# Patient Record
Sex: FEMALE | Race: WHITE | NOT HISPANIC OR LATINO | Employment: FULL TIME | URBAN - METROPOLITAN AREA
[De-identification: names, ages, dates, MRNs, and addresses within clinical notes are randomized per-mention and may not be internally consistent; named-entity substitution may affect disease eponyms.]

---

## 2017-12-29 DIAGNOSIS — Z00.00 PREVENTATIVE HEALTH CARE: Primary | ICD-10-CM

## 2017-12-29 PROBLEM — E78.5 HLD (HYPERLIPIDEMIA): Status: ACTIVE | Noted: 2017-12-29

## 2017-12-29 PROBLEM — E55.9 AVITAMINOSIS D: Status: ACTIVE | Noted: 2017-12-29

## 2017-12-29 PROBLEM — R53.83 FATIGUE: Status: ACTIVE | Noted: 2017-12-29

## 2017-12-29 PROBLEM — R73.9 BLOOD GLUCOSE ELEVATED: Status: ACTIVE | Noted: 2017-12-29

## 2017-12-29 PROBLEM — R06.89 LOUD BREATHING DURING SLEEP: Status: ACTIVE | Noted: 2017-12-29

## 2018-01-02 LAB
ALBUMIN SERPL-MCNC: 4.1 G/DL (ref 3.5–5.2)
ALBUMIN/GLOB SERPL: 1.5 G/DL
ALP SERPL-CCNC: 51 U/L (ref 39–117)
ALT SERPL-CCNC: 10 U/L (ref 1–33)
APPEARANCE UR: CLEAR
AST SERPL-CCNC: 11 U/L (ref 1–32)
BACTERIA #/AREA URNS HPF: ABNORMAL /HPF
BASOPHILS # BLD AUTO: 0.08 10*3/MM3 (ref 0–0.2)
BASOPHILS NFR BLD AUTO: 1.1 % (ref 0–1.5)
BILIRUB SERPL-MCNC: 0.3 MG/DL (ref 0.1–1.2)
BILIRUB UR QL STRIP: NEGATIVE
BUN SERPL-MCNC: 14 MG/DL (ref 6–20)
BUN/CREAT SERPL: 19.2 (ref 7–25)
CALCIUM SERPL-MCNC: 9.6 MG/DL (ref 8.6–10.5)
CASTS URNS MICRO: ABNORMAL
CHLORIDE SERPL-SCNC: 104 MMOL/L (ref 98–107)
CHOLEST SERPL-MCNC: 207 MG/DL (ref 0–200)
CHOLEST/HDLC SERPL: 3.39 {RATIO}
CO2 SERPL-SCNC: 23.4 MMOL/L (ref 22–29)
COLOR UR: YELLOW
CREAT SERPL-MCNC: 0.73 MG/DL (ref 0.57–1)
EOSINOPHIL # BLD AUTO: 0.25 10*3/MM3 (ref 0–0.7)
EOSINOPHIL NFR BLD AUTO: 3.6 % (ref 0.3–6.2)
EPI CELLS #/AREA URNS HPF: ABNORMAL /HPF
ERYTHROCYTE [DISTWIDTH] IN BLOOD BY AUTOMATED COUNT: 14.2 % (ref 11.7–13)
GLOBULIN SER CALC-MCNC: 2.8 GM/DL
GLUCOSE SERPL-MCNC: 115 MG/DL (ref 65–99)
GLUCOSE UR QL: NEGATIVE
HBA1C MFR BLD: 5.69 % (ref 4.8–5.6)
HCT VFR BLD AUTO: 37.4 % (ref 35.6–45.5)
HDLC SERPL-MCNC: 61 MG/DL (ref 40–60)
HGB BLD-MCNC: 12.1 G/DL (ref 11.9–15.5)
HGB UR QL STRIP: ABNORMAL
IMM GRANULOCYTES # BLD: 0.02 10*3/MM3 (ref 0–0.03)
IMM GRANULOCYTES NFR BLD: 0.3 % (ref 0–0.5)
KETONES UR QL STRIP: NEGATIVE
LDLC SERPL CALC-MCNC: 126 MG/DL (ref 0–100)
LEUKOCYTE ESTERASE UR QL STRIP: NEGATIVE
LYMPHOCYTES # BLD AUTO: 2.75 10*3/MM3 (ref 0.9–4.8)
LYMPHOCYTES NFR BLD AUTO: 39.5 % (ref 19.6–45.3)
MCH RBC QN AUTO: 26.7 PG (ref 26.9–32)
MCHC RBC AUTO-ENTMCNC: 32.4 G/DL (ref 32.4–36.3)
MCV RBC AUTO: 82.4 FL (ref 80.5–98.2)
MONOCYTES # BLD AUTO: 0.46 10*3/MM3 (ref 0.2–1.2)
MONOCYTES NFR BLD AUTO: 6.6 % (ref 5–12)
NEUTROPHILS # BLD AUTO: 3.41 10*3/MM3 (ref 1.9–8.1)
NEUTROPHILS NFR BLD AUTO: 48.9 % (ref 42.7–76)
NITRITE UR QL STRIP: NEGATIVE
PH UR STRIP: 5.5 [PH] (ref 5–8)
PLATELET # BLD AUTO: 349 10*3/MM3 (ref 140–500)
POTASSIUM SERPL-SCNC: 4.4 MMOL/L (ref 3.5–5.2)
PROT SERPL-MCNC: 6.9 G/DL (ref 6–8.5)
PROT UR QL STRIP: NEGATIVE
RBC # BLD AUTO: 4.54 10*6/MM3 (ref 3.9–5.2)
RBC #/AREA URNS HPF: ABNORMAL /HPF
SODIUM SERPL-SCNC: 141 MMOL/L (ref 136–145)
SP GR UR: 1.02 (ref 1–1.03)
T4 FREE SERPL-MCNC: 1.2 NG/DL (ref 0.93–1.7)
TRIGL SERPL-MCNC: 99 MG/DL (ref 0–150)
TSH SERPL DL<=0.005 MIU/L-ACNC: 1.96 MIU/ML (ref 0.27–4.2)
UROBILINOGEN UR STRIP-MCNC: ABNORMAL MG/DL
VLDLC SERPL CALC-MCNC: 19.8 MG/DL (ref 5–40)
WBC # BLD AUTO: 6.97 10*3/MM3 (ref 4.5–10.7)
WBC #/AREA URNS HPF: ABNORMAL /HPF

## 2018-01-08 ENCOUNTER — OFFICE VISIT (OUTPATIENT)
Dept: INTERNAL MEDICINE | Age: 38
End: 2018-01-08

## 2018-01-08 VITALS
WEIGHT: 158 LBS | OXYGEN SATURATION: 99 % | SYSTOLIC BLOOD PRESSURE: 120 MMHG | HEART RATE: 78 BPM | HEIGHT: 66 IN | BODY MASS INDEX: 25.39 KG/M2 | DIASTOLIC BLOOD PRESSURE: 72 MMHG | TEMPERATURE: 97.6 F

## 2018-01-08 DIAGNOSIS — Z00.00 ENCOUNTER FOR ANNUAL HEALTH EXAMINATION: Primary | ICD-10-CM

## 2018-01-08 DIAGNOSIS — E55.9 VITAMIN D DEFICIENCY: Chronic | ICD-10-CM

## 2018-01-08 DIAGNOSIS — R73.09 ELEVATED HEMOGLOBIN A1C: Chronic | ICD-10-CM

## 2018-01-08 DIAGNOSIS — R31.21 ASYMPTOMATIC MICROSCOPIC HEMATURIA: ICD-10-CM

## 2018-01-08 DIAGNOSIS — E78.2 MIXED HYPERLIPIDEMIA: Chronic | ICD-10-CM

## 2018-01-08 LAB
Lab: NORMAL
Lab: NORMAL

## 2018-01-08 PROCEDURE — 99395 PREV VISIT EST AGE 18-39: CPT | Performed by: INTERNAL MEDICINE

## 2018-01-08 PROCEDURE — 99213 OFFICE O/P EST LOW 20 MIN: CPT | Performed by: INTERNAL MEDICINE

## 2018-01-08 RX ORDER — GINSENG 100 MG
CAPSULE ORAL
COMMUNITY
Start: 2017-01-15 | End: 2018-01-08

## 2018-01-08 RX ORDER — ERGOCALCIFEROL 1.25 MG/1
50000 CAPSULE ORAL
COMMUNITY
End: 2018-01-08
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
## 2018-01-09 LAB
25(OH)D3+25(OH)D2 SERPL-MCNC: 9.6 NG/ML (ref 30–100)
WRITTEN AUTHORIZATION: NORMAL

## 2018-01-11 DIAGNOSIS — E55.9 VITAMIN D DEFICIENCY: Primary | Chronic | ICD-10-CM

## 2018-01-11 NOTE — TELEPHONE ENCOUNTER
Pt to start 50,000 u 1 time weekly for 4 months then take 2000 u daily OTC and recheck Vitamin d 4 months. Per Dr Orellana. RUBA

## 2018-01-24 DIAGNOSIS — E55.9 VITAMIN D DEFICIENCY: Chronic | ICD-10-CM

## 2019-03-14 ENCOUNTER — TELEPHONE (OUTPATIENT)
Dept: INTERNAL MEDICINE | Age: 39
End: 2019-03-14

## 2019-03-14 ENCOUNTER — OFFICE VISIT (OUTPATIENT)
Dept: INTERNAL MEDICINE | Age: 39
End: 2019-03-14

## 2019-03-14 VITALS
WEIGHT: 154 LBS | HEART RATE: 98 BPM | HEIGHT: 66 IN | DIASTOLIC BLOOD PRESSURE: 90 MMHG | OXYGEN SATURATION: 99 % | RESPIRATION RATE: 16 BRPM | BODY MASS INDEX: 24.75 KG/M2 | TEMPERATURE: 97.5 F | SYSTOLIC BLOOD PRESSURE: 164 MMHG

## 2019-03-14 DIAGNOSIS — R29.898 BILATERAL LEG WEAKNESS: ICD-10-CM

## 2019-03-14 DIAGNOSIS — R25.2 HAND CRAMPS: ICD-10-CM

## 2019-03-14 DIAGNOSIS — R52 BODY ACHES: Primary | ICD-10-CM

## 2019-03-14 DIAGNOSIS — J06.9 UPPER RESPIRATORY TRACT INFECTION, UNSPECIFIED TYPE: ICD-10-CM

## 2019-03-14 LAB
EXPIRATION DATE: NORMAL
FLUAV AG NPH QL: NEGATIVE
FLUBV AG NPH QL: NEGATIVE
INTERNAL CONTROL: NORMAL
Lab: NORMAL

## 2019-03-14 PROCEDURE — 87804 INFLUENZA ASSAY W/OPTIC: CPT | Performed by: NURSE PRACTITIONER

## 2019-03-14 PROCEDURE — 99213 OFFICE O/P EST LOW 20 MIN: CPT | Performed by: NURSE PRACTITIONER

## 2019-03-14 NOTE — TELEPHONE ENCOUNTER
Patient was seen this morning (03-), patient called to check on the status of her lab work.  Patient also says Tylenol and Advil are not working, patient wants to know if there's anything else she can do or if there's any other medication that can be prescribed.  Please advise. Patient can be reached at [phone]

## 2019-03-14 NOTE — PROGRESS NOTES
"Gia Graves / 38 y.o. / female  Encounter Date: 03/14/2019    ASSESSMENT & PLAN:    Problem List Items Addressed This Visit     None      Visit Diagnoses     Body aches    -  Primary    Relevant Orders    POC Influenza A / B (Completed)    Sedimentation rate, automated    C-reactive protein    Aldolase    CK MB    CK    Upper respiratory tract infection, unspecified type        Bilateral leg weakness        Relevant Orders    Sedimentation rate, automated    C-reactive protein    Aldolase    CK MB    CK    Hand cramps            Orders Placed This Encounter   Procedures   • Sedimentation rate, automated   • C-reactive protein   • Aldolase   • CK MB   • CK   • POC Influenza A / B     No orders of the defined types were placed in this encounter.      Summary/Discussion:  1. Inflammatory and muscle enzyme labs check for complaint of full body muscle aches and [place] leg weakness. E&M based on results.   2. Symptoms present as acute viral illness. Recommended pt take NSAIDs at home for aches PRN, rest frequently, push hydration.  3. RTC if sx fail to improve or worsen. F/u with next scheduled visit with Dr. Orellana.     Return if symptoms worsen or fail to improve, for Next scheduled follow up.  ________________________________________________________________    VITALS:    Visit Vitals  /90   Pulse 98   Temp 97.5 °F (36.4 °C)   Resp 16   Ht 167.6 cm (66\")   Wt 69.9 kg (154 lb)   SpO2 99%   BMI 24.86 kg/m²       BP Readings from Last 3 Encounters:   03/14/19 164/90   01/08/18 120/72   05/19/15 130/80     Wt Readings from Last 3 Encounters:   03/14/19 69.9 kg (154 lb)   01/08/18 71.7 kg (158 lb)   05/19/15 71.7 kg (158 lb 0.1 oz)      Body mass index is 24.86 kg/m².    CC: Main reason(s) for today's visit: Sore Throat (sore throat and chills x 5 days)    HPI  Sore throat and subjective fever onset 4 days ago.  3 days ago sore throat with chest tightness, itchy ears bilaterally.  2 days ago patient felt improvement " in symptoms in the morning but afternoon bilateral leg soreness from knee below began, with weakness when walking.  Reports she went to work with weak leg feeling.  After returning home she vomited one time.  Reports decreased appetite but no nausea today, sore throat resolved, no fevers or chills. Has taken a few dose of Tylenol for discomfort and aching with minimal relief. Chest pain resolved.  Denies cough, sneezing, vision changes, congestion.  Current complaints include bilateral legs, arms, hands aching and soreness x 1 day without respiratory symptoms. Arms are also intermittently itchy. Denies allergies or contact with any new substances.     Patient Care Team:  Kenyon Orellana MD as PCP - General (Internal Medicine)  ____________________________________________________________________    REVIEW OF SYSTEMS    Review of Systems   Constitutional: Positive for activity change (decreased), appetite change (decreased) and fatigue. Negative for chills and fever.   HENT: Negative for congestion, ear pain, facial swelling, postnasal drip, rhinorrhea, sinus pressure, sinus pain, sneezing and sore throat.    Eyes: Negative.    Respiratory: Negative for cough and shortness of breath.    Cardiovascular: Negative for chest pain and palpitations.   Gastrointestinal: Positive for nausea (resolved) and vomiting (x 1 yesterday).   Genitourinary: Negative.    Musculoskeletal: Positive for arthralgias and myalgias. Negative for gait problem.   Skin: Negative.    Neurological: Positive for weakness. Negative for dizziness and headaches.   Psychiatric/Behavioral: Negative.        PHYSICAL EXAMINATION    Physical Exam   Constitutional: She is oriented to person, place, and time.   BP elevated.   HENT:   Head: Normocephalic.   Right Ear: External ear normal.   Left Ear: External ear normal.   Nose: Nose normal.   Mouth/Throat: Oropharynx is clear and moist. No oropharyngeal exudate.   Eyes: Conjunctivae and EOM are normal.   Neck:  Normal range of motion. Neck supple.   Cardiovascular: Normal rate, regular rhythm and normal heart sounds.   Pulmonary/Chest: Effort normal and breath sounds normal.   Musculoskeletal: Normal range of motion. She exhibits no edema.   VARGHESE LE sore and aching, weakness with walking. VARGHESE hands and forearms aching and intermittently itchy.   Lymphadenopathy:     She has no cervical adenopathy.   Neurological: She is alert and oriented to person, place, and time.   Skin: Skin is warm and dry. No rash noted.   Vitals reviewed.    REVIEWED DATA:    Labs:   Lab Results   Component Value Date     01/02/2018    K 4.4 01/02/2018    AST 11 01/02/2018    ALT 10 01/02/2018    BUN 14 01/02/2018    CREATININE 0.73 01/02/2018    CREATININE 0.70 05/06/2015    EGFRIFNONA 90 01/02/2018    EGFRIFAFRI 109 01/02/2018       Lab Results   Component Value Date    HGBA1C 5.69 (H) 01/02/2018    HGBA1C 5.8 (H) 05/06/2015       Lab Results   Component Value Date     (H) 01/02/2018     (H) 05/06/2015    HDL 61 (H) 01/02/2018    TRIG 99 01/02/2018    CHOLHDLRATIO 3.39 01/02/2018       Lab Results   Component Value Date    TSH 1.960 01/02/2018    FREET4 1.20 01/02/2018          Lab Results   Component Value Date    WBC 6.97 01/02/2018    HGB 12.1 01/02/2018    HGB 12.1 05/06/2015     01/02/2018         Imaging:      Medical Tests:      Summary of old records / correspondence / consultant report:      Request outside records:   ______________________________________________________________________    ALLERGIES  Allergies   Allergen Reactions   • No Known Drug Allergy         MEDICATIONS  Current Outpatient Medications on File Prior to Visit   Medication Sig   • [DISCONTINUED] Cholecalciferol (VITAMIN D3) 01090 units tablet Take 50,000 Units by mouth Every 7 (Seven) Days.     No current facility-administered medications on file prior to visit.        PFSH:     The following portions of the patient's history were reviewed  and updated as appropriate: Allergies / Current Medications / Past Medical History / Surgical History / Social History / Family History    PROBLEM LIST   Patient Active Problem List   Diagnosis   • Mixed hyperlipidemia   • Vitamin D deficiency   • Elevated hemoglobin A1c   • Asymptomatic microscopic hematuria       PAST MEDICAL HISTORY  Past Medical History:   Diagnosis Date   • Hyperlipidemia    • Vitamin D deficiency        SURGICAL HISTORY  Past Surgical History:   Procedure Laterality Date   • APPENDECTOMY         SOCIAL HISTORY  Social History     Socioeconomic History   • Marital status: Single     Spouse name: Not on file   • Number of children: 0   • Years of education: Not on file   • Highest education level: Not on file   Occupational History   • Occupation: ([employer]    Tobacco Use   • Smoking status: Former Smoker     Types: Cigarettes   • Smokeless tobacco: Never Used   • Tobacco comment: former social smoker when younger   Substance and Sexual Activity   • Alcohol use: Yes     Comment: occasionally during weekends   • Sexual activity: No     Birth control/protection: None       FAMILY HISTORY  Family History   Problem Relation Age of Onset   • Hyperlipidemia Mother    • Diabetes Mother    • Other Father         prediabetes   • Hypertension Father    • Hyperlipidemia Father    • Lung cancer Maternal Grandmother    • Aortic aneurysm Paternal Grandfather    • Colon cancer Neg Hx    • Breast cancer Neg Hx

## 2019-03-14 NOTE — PROGRESS NOTES
4 days ago, feels like fever, took tylenol.   Monday- pain in chest, sore throat, itchy ears.  tues-  Wed- improving. Afternoon sore legs knee and lower, walking decreased. Drank vitamin c. Part time job. Vomitted. Tylenol and bed  thurs morning- body aching and itching. Decreased appetite.   No fever

## 2019-03-14 NOTE — TELEPHONE ENCOUNTER
Left message for patient: Tramadol called in to pt's pharmacy for VARGHESE leg pain/cramps. Verbal order from Dr. Orellana for medication approval.  Add on BMP to today's labs to look for electrolyte deficiencies, plan to treat accordingly.

## 2019-03-15 ENCOUNTER — TELEPHONE (OUTPATIENT)
Dept: INTERNAL MEDICINE | Age: 39
End: 2019-03-15

## 2019-03-15 DIAGNOSIS — R52 BODY ACHES: Primary | ICD-10-CM

## 2019-03-15 LAB
ALDOLASE SERPL-CCNC: 3.1 U/L (ref 3.3–10.3)
BUN SERPL-MCNC: NORMAL MG/DL
CALCIUM SERPL-MCNC: NORMAL MG/DL
CHLORIDE SERPL-SCNC: NORMAL MMOL/L
CK MB SERPL-MCNC: NORMAL NG/ML
CK SERPL-CCNC: 49 U/L (ref 20–180)
CO2 SERPL-SCNC: NORMAL MMOL/L
CREAT SERPL-MCNC: NORMAL MG/DL
CRP SERPL-MCNC: 2.2 MG/DL (ref 0–0.5)
ERYTHROCYTE [SEDIMENTATION RATE] IN BLOOD BY WESTERGREN METHOD: 25 MM/HR (ref 0–20)
GLUCOSE SERPL-MCNC: NORMAL MG/DL
POTASSIUM SERPL-SCNC: NORMAL MMOL/L
SODIUM SERPL-SCNC: NORMAL MMOL/L

## 2019-03-15 RX ORDER — TRAMADOL HYDROCHLORIDE 50 MG/1
50 TABLET ORAL EVERY 6 HOURS PRN
Qty: 9 TABLET | Refills: 0 | OUTPATIENT
Start: 2019-03-15 | End: 2019-03-27

## 2019-03-15 NOTE — TELEPHONE ENCOUNTER
Patient is calling requesting someone call her with her lab results.  Please advise.  Patient can be reached at [phone]

## 2019-03-15 NOTE — TELEPHONE ENCOUNTER
Spoke to patient to make sure she got the message about sending Tramadol to her pharmacy.  She got the message and is picking up the medication today.  I also told her that we will call her once her lab results are in   ----- Message from BRENT Bustos sent at 3/15/2019  8:00 AM EDT -----  Doris,   Please call Tabby to check on tramadol pickup.

## 2019-03-15 NOTE — TELEPHONE ENCOUNTER
Spoke to patient and informed her that her labs were not in yet. Advised [facility] to [facility] so she could monitor the results over the weekend.

## 2019-03-18 ENCOUNTER — TELEPHONE (OUTPATIENT)
Dept: INTERNAL MEDICINE | Age: 39
End: 2019-03-18

## 2019-03-18 DIAGNOSIS — R52 BODY ACHES: Primary | ICD-10-CM

## 2019-03-18 NOTE — TELEPHONE ENCOUNTER
Spoke to patient and informed her that her labs did show non-specific inflammation.  She said that she was feeling better.  Since she is feeling better, Leydi said she can f/u as needed.  I also informed her that we did add a potassium to her labs and the results should be in in a day or so.  She expressed understanding

## 2019-03-25 ENCOUNTER — TELEPHONE (OUTPATIENT)
Dept: INTERNAL MEDICINE | Age: 39
End: 2019-03-25

## 2019-03-25 NOTE — TELEPHONE ENCOUNTER
After speaking to Leydi, I called that patient and she says she is still not feeling right and thinks that she may need to have some more labs drawn.  Advised her that she will need an appt.  She exressed understanding and an appointment was made for her on the phone today.

## 2019-03-25 NOTE — PROGRESS NOTES
"Gia Graves / 38 y.o. / female  Encounter Date: 03/27/2019    ASSESSMENT & PLAN:    Problem List Items Addressed This Visit        Digestive    Vitamin D deficiency (Chronic)    Relevant Orders    Vitamin D 25 Hydroxy      Other Visit Diagnoses     Bilateral leg weakness    -  Primary    Relevant Orders    Comprehensive Metabolic Panel    CBC & Differential    CK    GM    Muscle cramping        Relevant Orders    Comprehensive Metabolic Panel    CBC & Differential    CK    GM    Pain in both upper extremities        Relevant Orders    Comprehensive Metabolic Panel    CBC & Differential    CK    GM        Orders Placed This Encounter   Procedures   • Comprehensive Metabolic Panel   • Vitamin D 25 Hydroxy   • CK   • GM   • CBC & Differential     No orders of the defined types were placed in this encounter.      Summary/Discussion:  1. Labs ordered to explore possible electrolyte imbalances, kidney and liver function, autoimmune response, anemia/infectious conditions, muscle enzymes, vitamin d level (history of vitamin d deficiency).   2. Advised pt labs are likely to be normal today since symptoms are resolved at this time. She verbalizes she wants them all done anyway. I told her to go to the ED if symptoms return to have complete work up done for unspecified, intermittent extremity pain and weakness.     Return if symptoms worsen or fail to improve.  ________________________________________________________________    VITALS:    Visit Vitals  /64   Pulse 88   Temp 97 °F (36.1 °C)   Resp 16   Ht 167.6 cm (66\")   Wt 68.9 kg (152 lb)   SpO2 99%   BMI 24.53 kg/m²       BP Readings from Last 3 Encounters:   03/27/19 120/64   03/14/19 164/90   01/08/18 120/72     Wt Readings from Last 3 Encounters:   03/27/19 68.9 kg (152 lb)   03/14/19 69.9 kg (154 lb)   01/08/18 71.7 kg (158 lb)      Body mass index is 24.53 kg/m².    CC: Main reason(s) for today's visit: Generalized Body Aches (patients still having " body aches- especially the left arm and right hand, also having nausea)    HPI    Patient is a 38 y.o. female who is here for complaints of left total arm pain and right wrist and hand pain 2 days ago, and nausea.  Symptoms have since resolved.  2 weeks ago she had onset of leg weakness and weakness of bilateral arms at that time.  No preceding infection, or illnesses.  Patient has not been engaged in any new physical activities.  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  Neither job exacerbates the symptoms.  All symptoms are resolved today.  Pain is not immobile paralysis, numbness, tingling, sharp.  She said when experiencing the pain it feels like she just does not want to use that limb that is affecte because it hurts, but she maintains full mobility despite pain.  She follows a regular diet including red meat and other animal proteins, except for 2 days a week she has a vegan diet for Shinto reasons.  Family history negative for autoimmune disorders.    Bilateral DTRs intact, normal.  Upper and lower extremity strength equal bilaterally.  Upper and lower extremity pulses strong, intact, equal bilaterally.  Denies chest pain, palpitations, headaches, vision changes, SOA, weakness, sensory deficits.     Patient Care Team:  Kenyon Orellana MD as PCP - General (Internal Medicine)  ____________________________________________________________________    REVIEW OF SYSTEMS    Review of Systems   Constitutional: Negative for activity change, appetite change, chills, fatigue, fever and unexpected weight change.   HENT: Negative.    Eyes: Negative.    Respiratory: Negative.    Cardiovascular: Negative for chest pain, palpitations and leg swelling.   Gastrointestinal: Negative.  Negative for constipation, diarrhea and nausea.   Endocrine: Negative.    Genitourinary: Negative.    Musculoskeletal: Negative for arthralgias and myalgias.   Skin: Negative.    Neurological: Negative.  Negative for headaches.    Psychiatric/Behavioral: Negative.      PHYSICAL EXAMINATION    Physical Exam   Constitutional: She is oriented to person, place, and time. She appears well-developed. No distress.   Eyes: Conjunctivae and EOM are normal. Pupils are equal, round, and reactive to light.   Neck: Normal range of motion. Neck supple.   Cardiovascular: Normal rate, regular rhythm, normal heart sounds and intact distal pulses.   Pulmonary/Chest: Effort normal and breath sounds normal.   Musculoskeletal: Normal range of motion. She exhibits no edema, tenderness or deformity.   Lymphadenopathy:     She has no cervical adenopathy.   Neurological: She is alert and oriented to person, place, and time. She displays normal reflexes. No cranial nerve deficit or sensory deficit. She exhibits normal muscle tone. Coordination normal.   Skin: Skin is warm and dry. No rash noted. No erythema.   Psychiatric: She has a normal mood and affect.   Vitals reviewed.    REVIEWED DATA:    Labs:   Lab Results   Component Value Date    NA CANCELED 03/14/2019    K CANCELED 03/14/2019    AST 11 01/02/2018    ALT 10 01/02/2018    BUN CANCELED 03/14/2019    CREATININE CANCELED 03/14/2019    CREATININE 0.73 01/02/2018    CREATININE 0.70 05/06/2015    EGFRIFNONA 90 01/02/2018    EGFRIFAFRI 109 01/02/2018       Lab Results   Component Value Date    HGBA1C 5.69 (H) 01/02/2018    HGBA1C 5.8 (H) 05/06/2015       Lab Results   Component Value Date     (H) 01/02/2018     (H) 05/06/2015    HDL 61 (H) 01/02/2018    TRIG 99 01/02/2018    CHOLHDLRATIO 3.39 01/02/2018       Lab Results   Component Value Date    TSH 1.960 01/02/2018    FREET4 1.20 01/02/2018          Lab Results   Component Value Date    WBC 6.97 01/02/2018    HGB 12.1 01/02/2018    HGB 12.1 05/06/2015     01/02/2018         Imaging:      Medical Tests:      Summary of old records / correspondence / consultant report:      Request outside records:    ______________________________________________________________________    ALLERGIES  Allergies   Allergen Reactions   • No Known Drug Allergy         MEDICATIONS  Current Outpatient Medications on File Prior to Visit   Medication Sig   • [DISCONTINUED] traMADol (ULTRAM) 50 MG tablet Take 1 tablet by mouth Every 6 (Six) Hours As Needed for Moderate Pain .     No current facility-administered medications on file prior to visit.        PFSH:     The following portions of the patient's history were reviewed and updated as appropriate: Allergies / Current Medications / Past Medical History / Surgical History / Social History / Family History    PROBLEM LIST   Patient Active Problem List   Diagnosis   • Mixed hyperlipidemia   • Vitamin D deficiency   • Elevated hemoglobin A1c   • Asymptomatic microscopic hematuria       PAST MEDICAL HISTORY  Past Medical History:   Diagnosis Date   • Hyperlipidemia    • Vitamin D deficiency        SURGICAL HISTORY  Past Surgical History:   Procedure Laterality Date   • APPENDECTOMY         SOCIAL HISTORY  Social History     Socioeconomic History   • Marital status: Single     Spouse name: Not on file   • Number of children: 0   • Years of education: Not on file   • Highest education level: Not on file   Occupational History   • Occupation: ([employer]    Tobacco Use   • Smoking status: Former Smoker     Types: Cigarettes   • Smokeless tobacco: Never Used   • Tobacco comment: former social smoker when younger   Substance and Sexual Activity   • Alcohol use: Yes     Comment: occasionally during weekends   • Sexual activity: No     Birth control/protection: None       FAMILY HISTORY  Family History   Problem Relation Age of Onset   • Hyperlipidemia Mother    • Diabetes Mother    • Other Father         prediabetes   • Hypertension Father    • Hyperlipidemia Father    • Lung cancer Maternal Grandmother    • Aortic aneurysm Paternal Grandfather    • Colon cancer Neg Hx     • Breast cancer Neg Hx

## 2019-03-25 NOTE — TELEPHONE ENCOUNTER
Patient wanted to know her recent lab results especially potassium that Capri ordered.       She wanted to let Leydi know she had no pain in Left arm or right hand pain until she was woken up with pain early Saturday morning. She had pain all day Sunday but today when she woke up she has been pain free thus far.

## 2019-03-27 ENCOUNTER — OFFICE VISIT (OUTPATIENT)
Dept: INTERNAL MEDICINE | Age: 39
End: 2019-03-27

## 2019-03-27 VITALS
HEART RATE: 88 BPM | RESPIRATION RATE: 16 BRPM | WEIGHT: 152 LBS | TEMPERATURE: 97 F | HEIGHT: 66 IN | BODY MASS INDEX: 24.43 KG/M2 | DIASTOLIC BLOOD PRESSURE: 64 MMHG | SYSTOLIC BLOOD PRESSURE: 120 MMHG | OXYGEN SATURATION: 99 %

## 2019-03-27 DIAGNOSIS — E55.9 VITAMIN D DEFICIENCY: Chronic | ICD-10-CM

## 2019-03-27 DIAGNOSIS — M79.602 PAIN IN BOTH UPPER EXTREMITIES: ICD-10-CM

## 2019-03-27 DIAGNOSIS — R25.2 MUSCLE CRAMPING: ICD-10-CM

## 2019-03-27 DIAGNOSIS — R29.898 BILATERAL LEG WEAKNESS: Primary | ICD-10-CM

## 2019-03-27 DIAGNOSIS — M79.601 PAIN IN BOTH UPPER EXTREMITIES: ICD-10-CM

## 2019-03-27 PROCEDURE — 99214 OFFICE O/P EST MOD 30 MIN: CPT | Performed by: NURSE PRACTITIONER

## 2019-03-28 LAB
25(OH)D3+25(OH)D2 SERPL-MCNC: 22.9 NG/ML (ref 30–100)
ALBUMIN SERPL-MCNC: 4.5 G/DL (ref 3.5–5.2)
ALBUMIN/GLOB SERPL: 1.6 G/DL
ALP SERPL-CCNC: 77 U/L (ref 39–117)
ALT SERPL-CCNC: 36 U/L (ref 1–33)
ANA SER QL: NEGATIVE
AST SERPL-CCNC: 18 U/L (ref 1–32)
BASOPHILS # BLD AUTO: (no result) 10*3/UL
BASOPHILS # BLD MANUAL: 0.3 10*3/MM3 (ref 0–0.2)
BASOPHILS NFR BLD MANUAL: 2 % (ref 0–1.5)
BILIRUB SERPL-MCNC: 0.5 MG/DL (ref 0.2–1.2)
BUN SERPL-MCNC: 7 MG/DL (ref 6–20)
BUN/CREAT SERPL: 8.5 (ref 7–25)
CALCIUM SERPL-MCNC: 9.8 MG/DL (ref 8.6–10.5)
CHLORIDE SERPL-SCNC: 101 MMOL/L (ref 98–107)
CK SERPL-CCNC: 41 U/L (ref 20–180)
CO2 SERPL-SCNC: 24.8 MMOL/L (ref 22–29)
CREAT SERPL-MCNC: 0.82 MG/DL (ref 0.57–1)
DIFFERENTIAL COMMENT: ABNORMAL
EOSINOPHIL # BLD AUTO: (no result) 10*3/UL
EOSINOPHIL # BLD MANUAL: 7 10*3/MM3 (ref 0–0.4)
EOSINOPHIL NFR BLD AUTO: (no result) %
EOSINOPHIL NFR BLD MANUAL: 46 % (ref 0.3–6.2)
ERYTHROCYTE [DISTWIDTH] IN BLOOD BY AUTOMATED COUNT: 14.1 % (ref 12.3–15.4)
GLOBULIN SER CALC-MCNC: 2.9 GM/DL
GLUCOSE SERPL-MCNC: 98 MG/DL (ref 65–99)
HCT VFR BLD AUTO: 40.7 % (ref 34–46.6)
HGB BLD-MCNC: 12.5 G/DL (ref 12–15.9)
LYMPHOCYTES # BLD AUTO: (no result) 10*3/UL
LYMPHOCYTES # BLD MANUAL: 2.74 10*3/MM3 (ref 0.7–3.1)
LYMPHOCYTES NFR BLD AUTO: (no result) %
LYMPHOCYTES NFR BLD MANUAL: 18 % (ref 19.6–45.3)
MCH RBC QN AUTO: 26 PG (ref 26.6–33)
MCHC RBC AUTO-ENTMCNC: 30.7 G/DL (ref 31.5–35.7)
MCV RBC AUTO: 84.6 FL (ref 79–97)
MONOCYTES # BLD MANUAL: 0.3 10*3/MM3 (ref 0.1–0.9)
MONOCYTES NFR BLD AUTO: (no result) %
MONOCYTES NFR BLD MANUAL: 2 % (ref 5–12)
NEUTROPHILS # BLD MANUAL: 4.87 10*3/MM3 (ref 1.4–7)
NEUTROPHILS NFR BLD AUTO: (no result) %
NEUTROPHILS NFR BLD MANUAL: 32 % (ref 42.7–76)
PLATELET # BLD AUTO: 435 10*3/MM3 (ref 140–450)
PLATELET BLD QL SMEAR: ABNORMAL
POTASSIUM SERPL-SCNC: 4.6 MMOL/L (ref 3.5–5.2)
PROT SERPL-MCNC: 7.4 G/DL (ref 6–8.5)
RBC # BLD AUTO: 4.81 10*6/MM3 (ref 3.77–5.28)
RBC MORPH BLD: ABNORMAL
SODIUM SERPL-SCNC: 140 MMOL/L (ref 136–145)
WBC # BLD AUTO: 15.21 10*3/MM3 (ref 3.4–10.8)

## 2019-04-01 ENCOUNTER — OFFICE VISIT (OUTPATIENT)
Dept: INTERNAL MEDICINE | Age: 39
End: 2019-04-01

## 2019-04-01 VITALS
OXYGEN SATURATION: 97 % | SYSTOLIC BLOOD PRESSURE: 132 MMHG | DIASTOLIC BLOOD PRESSURE: 74 MMHG | HEIGHT: 66 IN | WEIGHT: 152 LBS | BODY MASS INDEX: 24.43 KG/M2 | HEART RATE: 76 BPM | TEMPERATURE: 97.8 F

## 2019-04-01 DIAGNOSIS — M79.10 MYALGIA: ICD-10-CM

## 2019-04-01 DIAGNOSIS — D72.10 EOSINOPHILIA: Primary | ICD-10-CM

## 2019-04-01 PROCEDURE — 99215 OFFICE O/P EST HI 40 MIN: CPT | Performed by: INTERNAL MEDICINE

## 2019-04-01 NOTE — PROGRESS NOTES
"[facility] INTERNAL MEDICINE  MARCELO GILLIS M.D.      Gia Graves / 38 y.o. / female  04/01/2019      ASSESSMENT & PLAN:    1. Eosinophilia    2. Myalgia      Orders Placed This Encounter   Procedures   • TSH+Free T4   • CBC & Differential     No orders of the defined types were placed in this encounter.       Summary/Discussion:    Spent 40 minutes in face-to-face encounter time and >50% of time spent in counseling regarding above medical problems/issues.     Fortunately her neurologic / myalgia symptoms have resolved.   DDx: Immune reaction to viral URI, less likely a parasitic infection or rare eosinophilic myalgia syndrome.   Recheck CBC with differential and TFT's.   She was instructed to call or return if the condition is not improving or worsening. She expressed understanding and agreed to follow above instructions.      She would like to see me for her CPE in April/May. Will okay.     ____________________________________________________________________    MEDICATIONS  No current outpatient medications on file.     No current facility-administered medications for this visit.           VITALS    Visit Vitals  /74   Pulse 76   Temp 97.8 °F (36.6 °C)   Ht 167.6 cm (65.98\")   Wt 68.9 kg (152 lb)   SpO2 97%   BMI 24.55 kg/m²       BP Readings from Last 3 Encounters:   04/01/19 132/74   03/27/19 120/64   03/14/19 164/90     Wt Readings from Last 3 Encounters:   04/01/19 68.9 kg (152 lb)   03/27/19 68.9 kg (152 lb)   03/14/19 69.9 kg (154 lb)      Body mass index is 24.55 kg/m².      CC:  Main reason(s) for today's visit: Bilateral leg weakness (F/U BRENT 3/27/2019)      HPI:     Seen by BRENT twice in March. Initially had URI type symptoms with sore throat on March 11. About 3 days later she developed BLE muscle \"weakness\" (below the knees) and then later in the day diffuse myalgia of BUE and BLE. Saw BRENT Angel) on 3/14 and labs were performed showing mildly elevated ESR and CRP with normal muscle enzyme levels. " She returned on 3/27 without improvement and additional labs were performed showing normal GM, CK again was normal, mildly decreased Vitamin D but CBC showed elevated WBC with significant eosinophilia (46%). She denies history of of asthma or allergies. No focal GI symptoms or any rash. Denies history of parasitic infection of GI problems. Over this weekend started feeling notably better so by Saturday felt close to normal. Denies ongoing muscle pain, weakness, paresthesia or numbness symptoms.      Patient Care Team:  Kenyon Orellana MD as PCP - General (Internal Medicine)  ____________________________________________________________________      REVIEW OF SYSTEMS    Review of Systems   Constitutional: Negative for appetite change, chills, fatigue, fever and unexpected weight change.   Respiratory: Negative.    Gastrointestinal: Negative.    Musculoskeletal: Negative for arthralgias, joint swelling and myalgias.   Skin: Negative for color change and rash.   Neurological: Negative for dizziness, seizures, weakness, numbness and headaches.   Hematological: Negative for adenopathy. Does not bruise/bleed easily.   All other systems reviewed and are negative.        PHYSICAL EXAMINATION    Physical Exam   Constitutional: She appears well-developed and well-nourished.   Neurological: She is alert.   Skin: Skin is warm. No rash noted. No erythema. No pallor.   Psychiatric: She has a normal mood and affect. Judgment and thought content normal.         REVIEWED DATA:    Labs:   Office Visit on 03/27/2019   Component Date Value Ref Range Status   • Glucose 03/27/2019 98  65 - 99 mg/dL Final   • BUN 03/27/2019 7  6 - 20 mg/dL Final   • Creatinine 03/27/2019 0.82  0.57 - 1.00 mg/dL Final   • eGFR Non African Am 03/27/2019 78  >60 mL/min/1.73 Final   • eGFR African Am 03/27/2019 95  >60 mL/min/1.73 Final   • BUN/Creatinine Ratio 03/27/2019 8.5  7.0 - 25.0 Final   • Sodium 03/27/2019 140  136 - 145 mmol/L Final   • Potassium  03/27/2019 4.6  3.5 - 5.2 mmol/L Final   • Chloride 03/27/2019 101  98 - 107 mmol/L Final   • Total CO2 03/27/2019 24.8  22.0 - 29.0 mmol/L Final   • Calcium 03/27/2019 9.8  8.6 - 10.5 mg/dL Final   • Total Protein 03/27/2019 7.4  6.0 - 8.5 g/dL Final   • Albumin 03/27/2019 4.50  3.50 - 5.20 g/dL Final   • Globulin 03/27/2019 2.9  gm/dL Final   • A/G Ratio 03/27/2019 1.6  g/dL Final   • Total Bilirubin 03/27/2019 0.5  0.2 - 1.2 mg/dL Final   • Alkaline Phosphatase 03/27/2019 77  39 - 117 U/L Final   • AST (SGOT) 03/27/2019 18  1 - 32 U/L Final   • ALT (SGPT) 03/27/2019 36* 1 - 33 U/L Final   • 25 Hydroxy, Vitamin D 03/27/2019 22.9* 30.0 - 100.0 ng/mL Final    Comment: Reference Range for Total Vitamin D 25(OH)  Deficiency <20.0 ng/mL  Insufficiency 21-29 ng/mL  Sufficiency  ng/mL  Toxicity >100 ng/ml     • WBC 03/27/2019 15.21* 3.40 - 10.80 10*3/mm3 Final   • RBC 03/27/2019 4.81  3.77 - 5.28 10*6/mm3 Final   • Hemoglobin 03/27/2019 12.5  12.0 - 15.9 g/dL Final   • Hematocrit 03/27/2019 40.7  34.0 - 46.6 % Final   • MCV 03/27/2019 84.6  79.0 - 97.0 fL Final   • MCH 03/27/2019 26.0* 26.6 - 33.0 pg Final   • MCHC 03/27/2019 30.7* 31.5 - 35.7 g/dL Final   • RDW 03/27/2019 14.1  12.3 - 15.4 % Final   • Platelets 03/27/2019 435  140 - 450 10*3/mm3 Final   • Neutrophil Rel % 03/27/2019 CANCELED   Final-Edited    Comment: Test not performed    Result canceled by the ancillary.     • Lymphocyte Rel % 03/27/2019 CANCELED   Final-Edited    Comment: Test not performed    Result canceled by the ancillary.     • Monocyte Rel % 03/27/2019 CANCELED   Final-Edited    Comment: Test not performed    Result canceled by the ancillary.     • Eosinophil Rel % 03/27/2019 CANCELED   Final-Edited    Comment: Test not performed    Result canceled by the ancillary.     • Lymphocytes Absolute 03/27/2019 CANCELED   Final-Edited    Comment: Test not performed    Result canceled by the ancillary.     • Eosinophils Absolute 03/27/2019  CANCELED   Final-Edited    Comment: Test not performed    Result canceled by the ancillary.     • Basophils Absolute 03/27/2019 CANCELED   Final-Edited    Comment: Test not performed    Result canceled by the ancillary.     • Creatine Kinase 03/27/2019 41  20 - 180 U/L Final   • GM Direct 03/27/2019 Negative  Negative Final   • Neutrophil Rel % 03/27/2019 32.0* 42.7 - 76.0 % Final   • Lymphocyte Rel % 03/27/2019 18.0* 19.6 - 45.3 % Final   • Monocyte Rel % 03/27/2019 2.0* 5.0 - 12.0 % Final   • Eosinophil Rel % 03/27/2019 46.0* 0.3 - 6.2 % Final   • Basophil Rel % 03/27/2019 2.0* 0.0 - 1.5 % Final   • Neutrophils Absolute 03/27/2019 4.87  1.40 - 7.00 10*3/mm3 Final   • Lymphocytes Absolute 03/27/2019 2.74  0.70 - 3.10 10*3/mm3 Final   • Monocytes Absolute 03/27/2019 0.30  0.10 - 0.90 10*3/mm3 Final   • Eosinophil Abs 03/27/2019 7.00* 0.00 - 0.40 10*3/mm3 Final   • Basophils Absolute 03/27/2019 0.30* 0.00 - 0.20 10*3/mm3 Final   • Differential Comment 03/27/2019 Comment   Final    WBC MORPHOLOGY               Normal                      Normal     N   • Comment 03/27/2019 Comment   Final    RBC MORPHOLOGY               Normal                      Normal     N   • Plt Comment 03/27/2019 Comment   Final    PLATELET MORPHOLOGY          Normal                      Normal     N   Office Visit on 03/14/2019   Component Date Value Ref Range Status   • Rapid Influenza A Ag 03/14/2019 Negative  Negative Final   • Rapid Influenza B Ag 03/14/2019 Negative  Negative Final   • Internal Control 03/14/2019 Passed  Passed Final   • Lot Number 03/14/2019 [id number]   Final   • Expiration Date 03/14/2019 7/2,021   Final   • Sed Rate 03/14/2019 25* 0 - 20 mm/hr Final   • C-Reactive Protein 03/14/2019 2.20* 0.00 - 0.50 mg/dL Final   • Aldolase 03/14/2019 3.1* 3.3 - 10.3 U/L Final   • CKMB 03/14/2019 CANCELED  ng/mL Final-Edited    Comment: Test not performed  Specimen Contaminated    Result canceled by the ancillary.     • Creatine Kinase  03/14/2019 49  20 - 180 U/L Final        Imaging:        Medical Tests:       Summary of old records / correspondence / consultant report:        Request outside records:       ALLERGIES  Allergies   Allergen Reactions   • No Known Drug Allergy         PFSH:     The following portions of the patient's history were reviewed and updated as appropriate: Allergies / Current Medications / Past Medical History / Surgical History / Social History / Family History    PROBLEM LIST   Patient Active Problem List   Diagnosis   • Mixed hyperlipidemia   • Vitamin D deficiency   • Elevated hemoglobin A1c   • Asymptomatic microscopic hematuria       PAST MEDICAL HISTORY  Past Medical History:   Diagnosis Date   • Hyperlipidemia    • Vitamin D deficiency        SURGICAL HISTORY  Past Surgical History:   Procedure Laterality Date   • APPENDECTOMY         SOCIAL HISTORY  Social History     Socioeconomic History   • Marital status: Single     Spouse name: Not on file   • Number of children: 0   • Years of education: Not on file   • Highest education level: Not on file   Occupational History   • Occupation: ([employer]    Tobacco Use   • Smoking status: Former Smoker     Types: Cigarettes   • Smokeless tobacco: Never Used   • Tobacco comment: former social smoker when younger   Substance and Sexual Activity   • Alcohol use: Yes     Comment: occasionally during weekends   • Sexual activity: No     Birth control/protection: None       FAMILY HISTORY  Family History   Problem Relation Age of Onset   • Hyperlipidemia Mother    • Diabetes Mother    • Other Father         prediabetes   • Hypertension Father    • Hyperlipidemia Father    • Lung cancer Maternal Grandmother    • Aortic aneurysm Paternal Grandfather    • Colon cancer Neg Hx    • Breast cancer Neg Hx          **Dragon Disclaimer:   Much of this encounter note is an electronic transcription/translation of spoken language to printed text. The electronic  translation of spoken language may permit erroneous, or at times, nonsensical words or phrases to be inadvertently transcribed. Although I have reviewed the note for such errors, some may still exist.       Template created by Brook Orellana MD

## 2019-04-02 ENCOUNTER — OFFICE VISIT (OUTPATIENT)
Dept: INTERNAL MEDICINE | Age: 39
End: 2019-04-02

## 2019-04-02 VITALS
HEIGHT: 66 IN | OXYGEN SATURATION: 96 % | TEMPERATURE: 98.4 F | BODY MASS INDEX: 24.43 KG/M2 | HEART RATE: 72 BPM | DIASTOLIC BLOOD PRESSURE: 70 MMHG | SYSTOLIC BLOOD PRESSURE: 132 MMHG | WEIGHT: 152 LBS

## 2019-04-02 DIAGNOSIS — D72.10 MARKED EOSINOPHILIA: Primary | ICD-10-CM

## 2019-04-02 LAB
BASOPHILS # BLD AUTO: (no result) 10*3/UL
BASOPHILS # BLD MANUAL: 0.17 10*3/MM3 (ref 0–0.2)
BASOPHILS NFR BLD MANUAL: 1 % (ref 0–1.5)
DIFFERENTIAL COMMENT: ABNORMAL
EOSINOPHIL # BLD AUTO: (no result) 10*3/UL
EOSINOPHIL # BLD MANUAL: 8.67 10*3/MM3 (ref 0–0.4)
EOSINOPHIL NFR BLD AUTO: (no result) %
EOSINOPHIL NFR BLD MANUAL: 50.5 % (ref 0.3–6.2)
ERYTHROCYTE [DISTWIDTH] IN BLOOD BY AUTOMATED COUNT: 13.9 % (ref 12.3–15.4)
HCT VFR BLD AUTO: 37.2 % (ref 34–46.6)
HGB BLD-MCNC: 11.4 G/DL (ref 12–15.9)
LYMPHOCYTES # BLD AUTO: (no result) 10*3/UL
LYMPHOCYTES # BLD MANUAL: 2.61 10*3/MM3 (ref 0.7–3.1)
LYMPHOCYTES NFR BLD AUTO: (no result) %
LYMPHOCYTES NFR BLD MANUAL: 15.2 % (ref 19.6–45.3)
MCH RBC QN AUTO: 25.9 PG (ref 26.6–33)
MCHC RBC AUTO-ENTMCNC: 30.6 G/DL (ref 31.5–35.7)
MCV RBC AUTO: 84.5 FL (ref 79–97)
MONOCYTES # BLD MANUAL: 0.17 10*3/MM3 (ref 0.1–0.9)
MONOCYTES NFR BLD AUTO: (no result) %
MONOCYTES NFR BLD MANUAL: 1 % (ref 5–12)
NEUTROPHILS # BLD MANUAL: 5.55 10*3/MM3 (ref 1.4–7)
NEUTROPHILS NFR BLD AUTO: (no result) %
NEUTROPHILS NFR BLD MANUAL: 32.3 % (ref 42.7–76)
PLATELET # BLD AUTO: 418 10*3/MM3 (ref 140–450)
PLATELET BLD QL SMEAR: ABNORMAL
RBC # BLD AUTO: 4.4 10*6/MM3 (ref 3.77–5.28)
RBC MORPH BLD: ABNORMAL
T4 FREE SERPL-MCNC: 1.36 NG/DL (ref 0.93–1.7)
TSH SERPL DL<=0.005 MIU/L-ACNC: 1 MIU/ML (ref 0.27–4.2)
WBC # BLD AUTO: 17.17 10*3/MM3 (ref 3.4–10.8)

## 2019-04-02 PROCEDURE — 99215 OFFICE O/P EST HI 40 MIN: CPT | Performed by: INTERNAL MEDICINE

## 2019-04-02 NOTE — ASSESSMENT & PLAN NOTE
Will need further testing as her eosinophilia has worsened since last lab.   Differential Dx: primary eosinophilia vs secondary to unknown infection (parasitic). Check peripheral blood smear, Ascariasis IgG, stool studies for O&P. Will refer to hematology for further assessment. Consider CT of chest/abd/pelvis. Will also consider referral to ID if needed.

## 2019-04-02 NOTE — PROGRESS NOTES
"[facility] INTERNAL MEDICINE  MARCELO GILLIS M.D.      Gia Graves / 38 y.o. / female  04/02/2019      ASSESSMENT & PLAN:    Problem List Items Addressed This Visit        High    Marked eosinophilia - Primary    Current Assessment & Plan     Will need further testing as her eosinophilia has worsened since last lab.   Differential Dx: primary eosinophilia vs secondary to unknown infection (parasitic). Check peripheral blood smear, Ascariasis IgG, stool studies for O&P. Will refer to hematology for further assessment. Consider CT of chest/abd/pelvis. Will also consider referral to ID if needed.          Relevant Orders    Ova & Parasite Examination - Stool, Per Rectum    Urinalysis With Microscopic If Indicated (No Culture) - Urine, Clean Catch    Peripheral Blood Smear    Strongyloides Antibody IgG, SHANTEL    Ambulatory Referral to Hematology / Oncology        Orders Placed This Encounter   Procedures   • Ova & Parasite Examination - Stool, Per Rectum   • Urinalysis With Microscopic If Indicated (No Culture) - Urine, Clean Catch   • Strongyloides Antibody IgG, SHANTEL   • Ambulatory Referral to Hematology / Oncology     No orders of the defined types were placed in this encounter.      Summary/Discussion:  · Spent 45 minutes in face-to-face encounter time and >50% of time spent in counseling regarding above medical problems/issues.      Return for worsening problem or if not improving, Next scheduled follow up.    ____________________________________________________________________    MEDICATIONS  No current outpatient medications on file.     No current facility-administered medications for this visit.           VITALS:    Visit Vitals  /70   Pulse 72   Temp 98.4 °F (36.9 °C)   Ht 167.6 cm (65.98\")   Wt 68.9 kg (152 lb)   SpO2 96%   BMI 24.55 kg/m²       BP Readings from Last 3 Encounters:   04/02/19 132/70   04/01/19 132/74   03/27/19 120/64     Wt Readings from Last 3 Encounters:   04/02/19 68.9 kg (152 lb)   04/01/19 " 68.9 kg (152 lb)   03/27/19 68.9 kg (152 lb)      Body mass index is 24.55 kg/m².    CC:  Main reason(s) for today's visit: Worsening hypereosinophilia      HPI:     She is noted to have worsening eosinophilia on labs from yesterday. Remarkably she is otherwise feeling fine. Denies fever, chills, fatigue, night sweat. Denies abdominal pain, significant n/v, diarrhea or blood in stool. She has noted some increased strawberry angioma like lesions on her arms and torso. No rash or other skin lesions. Denies cough, wheezing, shortness of breath. Denies significant chest pain or palpitations.     She does report having eaten fresh goat cheese within 1 week of initially presenting will upper respiratory symptoms.     Patient Care Team:  Kenyon Orellana MD as PCP - General (Internal Medicine)  Kenyon Orellana MD as Referring Physician (Internal Medicine)    ____________________________________________________________________    REVIEW OF SYSTEMS    Review of Systems  Constitutional neg  Resp neg  CV neg  GI neg   neg  Neuro neg for headaches, seizures, or muscle weakness/numbness  Psych anxiety  All other systems reviewed and negative       PHYSICAL EXAMINATION    Physical Exam  Constitutional  No distress  Cardiovascular Rate  normal . Rhythm: regular . Heart sounds:  Normal  Pulmonary/Chest  Effort normal. Breath sounds:  Normal  Abdomen: Soft and nontender, no palpable mass, no hepatomegaly.   Skin: multiple small strawberry angiomas on arms/torso/neck; no other skin rash/sores  Neuro: alert   Psychiatric  Alert. Judgment and thought content normal. Mood somewhat anxious    REVIEWED DATA:    Labs:     Contains abnormal data Manual Differential   Order: [id number]   Status:  Final result   Visible to patient:  No (Not Released)    Ref Range & Units 1d ago   Neutrophil Rel % 42.7 - 76.0 % 32.3 Abnormally low     Lymphocyte Rel % 19.6 - 45.3 % 15.2 Abnormally low     Monocyte Rel % 5.0 - 12.0 % 1.0 Abnormally low     Eosinophil  Rel % 0.3 - 6.2 % 50.5 Abnormally high     Basophil Rel % 0.0 - 1.5 % 1.0    Neutrophils Absolute 1.40 - 7.00 10*3/mm3 5.55    Lymphocytes Absolute 0.70 - 3.10 10*3/mm3 2.61    Monocytes Absolute 0.10 - 0.90 10*3/mm3 0.17    Eosinophil Abs 0.00 - 0.40 10*3/mm3 8.67 Abnormally high     Basophils Absolute 0.00 - 0.20 10*3/mm3 0.17    Differential Comment  Comment    Comment: WBC MORPHOLOGY               Normal                      Normal     N   Comment  Comment    Comment: ANISOCYTOSIS                 Slight/1+                   None Seen  N   MICROCYTES                   Mod/2+                      None Seen  N    Plt Comment  Comment    Comment: PLATELET MORPHOLOGY          Normal                      Normal     N             Lab Results   Component Value Date     03/27/2019    K 4.6 03/27/2019    AST 18 03/27/2019    ALT 36 (H) 03/27/2019    BUN 7 03/27/2019    CREATININE 0.82 03/27/2019    CREATININE CANCELED 03/14/2019    CREATININE 0.73 01/02/2018    EGFRIFNONA 78 03/27/2019    EGFRIFAFRI 95 03/27/2019       Lab Results   Component Value Date    TSH 1.000 04/01/2019    FREET4 1.36 04/01/2019       Lab Results   Component Value Date    WBC 17.17 (H) 04/01/2019    HGB 11.4 (L) 04/01/2019    HGB 12.5 03/27/2019    HGB 12.1 01/02/2018     04/01/2019       Lab Results   Component Value Date    PROTEIN Negative 01/02/2018    GLUCOSEU Negative 01/02/2018    BLOODU See below: (A) 01/02/2018    NITRITEU Negative 01/02/2018    LEUKOCYTESUR Negative 01/02/2018       Imaging:         Medical Tests:         Summary of old records / correspondence / consultant report:         Request outside records:         ALLERGIES  Allergies   Allergen Reactions   • No Known Drug Allergy         PFSH:     The following portions of the patient's history were reviewed and updated as appropriate: Allergies / Current Medications / Past Medical History / Surgical History / Social History / Family History    PROBLEM LIST   Patient  Active Problem List   Diagnosis   • Mixed hyperlipidemia   • Vitamin D deficiency   • Elevated hemoglobin A1c   • Asymptomatic microscopic hematuria   • Marked eosinophilia       PAST MEDICAL HISTORY  Past Medical History:   Diagnosis Date   • Hyperlipidemia    • Vitamin D deficiency        SURGICAL HISTORY  Past Surgical History:   Procedure Laterality Date   • APPENDECTOMY         SOCIAL HISTORY  Social History     Socioeconomic History   • Marital status: Single     Spouse name: Not on file   • Number of children: 0   • Years of education: Not on file   • Highest education level: Not on file   Occupational History   • Occupation: ([employer] [place] [employer]    Tobacco Use   • Smoking status: Former Smoker     Types: Cigarettes   • Smokeless tobacco: Never Used   • Tobacco comment: former social smoker when younger   Substance and Sexual Activity   • Alcohol use: Yes     Comment: occasionally during weekends   • Sexual activity: No     Birth control/protection: None       FAMILY HISTORY  Family History   Problem Relation Age of Onset   • Hyperlipidemia Mother    • Diabetes Mother    • Other Father         prediabetes   • Hypertension Father    • Hyperlipidemia Father    • Lung cancer Maternal Grandmother    • Aortic aneurysm Paternal Grandfather    • Colon cancer Neg Hx    • Breast cancer Neg Hx          **Dragon Disclaimer:   Much of this encounter note is an electronic transcription/translation of spoken language to printed text. The electronic translation of spoken language may permit erroneous, or at times, nonsensical words or phrases to be inadvertently transcribed. Although I have reviewed the note for such errors, some may still exist.       Template created by Brook Orellana MD

## 2019-04-04 LAB
APPEARANCE UR: CLEAR
BACTERIA #/AREA URNS HPF: ABNORMAL /HPF
BASOPHILS # BLD AUTO: 0.2 X10E3/UL (ref 0–0.2)
BASOPHILS NFR BLD AUTO: 1 %
BILIRUB UR QL STRIP: NEGATIVE
CASTS URNS MICRO: ABNORMAL
COLOR UR: YELLOW
EOSINOPHIL # BLD AUTO: 6 X10E3/UL (ref 0–0.4)
EOSINOPHIL NFR BLD AUTO: 52 %
EPI CELLS #/AREA URNS HPF: ABNORMAL /HPF
ERYTHROCYTE [DISTWIDTH] IN BLOOD BY AUTOMATED COUNT: 14.6 % (ref 12.3–15.4)
GLUCOSE UR QL: NEGATIVE
HCT VFR BLD AUTO: 35.2 % (ref 34–46.6)
HGB BLD-MCNC: 11.9 G/DL (ref 11.1–15.9)
HGB UR QL STRIP: ABNORMAL
IMM GRANULOCYTES # BLD AUTO: 0 X10E3/UL (ref 0–0.1)
IMM GRANULOCYTES NFR BLD AUTO: 0 %
KETONES UR QL STRIP: NEGATIVE
LEUKOCYTE ESTERASE UR QL STRIP: NEGATIVE
LYMPHOCYTES # BLD AUTO: 2.1 X10E3/UL (ref 0.7–3.1)
LYMPHOCYTES NFR BLD AUTO: 18 %
MCH RBC QN AUTO: 26.6 PG (ref 26.6–33)
MCHC RBC AUTO-ENTMCNC: 33.8 G/DL (ref 31.5–35.7)
MCV RBC AUTO: 79 FL (ref 79–97)
MONOCYTES # BLD AUTO: 0.4 X10E3/UL (ref 0.1–0.9)
MONOCYTES NFR BLD AUTO: 3 %
MORPHOLOGY BLD-IMP: ABNORMAL
NEUTROPHILS # BLD AUTO: 3.1 X10E3/UL (ref 1.4–7)
NEUTROPHILS NFR BLD AUTO: 26 %
NITRITE UR QL STRIP: NEGATIVE
O+P SPEC MICRO: NORMAL
O+P STL CONC: NORMAL
PATH INTERP BLD-IMP: ABNORMAL
PATH REV BLD -IMP: ABNORMAL
PATH REV BLD -IMP: ABNORMAL
PATHOLOGIST NAME: ABNORMAL
PH UR STRIP: 6.5 [PH] (ref 5–8)
PLATELET # BLD AUTO: 452 X10E3/UL (ref 150–379)
PROT UR QL STRIP: NEGATIVE
RBC # BLD AUTO: 4.48 X10E6/UL (ref 3.77–5.28)
RBC #/AREA URNS HPF: ABNORMAL /HPF
SP GR UR: 1.02 (ref 1–1.03)
STRONGYLOIDES IGG SER QL IA: NEGATIVE
UROBILINOGEN UR STRIP-MCNC: ABNORMAL MG/DL
WBC # BLD AUTO: 11.8 X10E3/UL (ref 3.4–10.8)
WBC #/AREA URNS HPF: ABNORMAL /HPF

## 2019-04-05 ENCOUNTER — CONSULT (OUTPATIENT)
Dept: ONCOLOGY | Facility: CLINIC | Age: 39
End: 2019-04-05

## 2019-04-05 ENCOUNTER — TELEPHONE (OUTPATIENT)
Dept: INTERNAL MEDICINE | Age: 39
End: 2019-04-05

## 2019-04-05 ENCOUNTER — LAB (OUTPATIENT)
Dept: LAB | Facility: HOSPITAL | Age: 39
End: 2019-04-05

## 2019-04-05 VITALS
RESPIRATION RATE: 14 BRPM | DIASTOLIC BLOOD PRESSURE: 91 MMHG | WEIGHT: 149.2 LBS | SYSTOLIC BLOOD PRESSURE: 160 MMHG | TEMPERATURE: 98.2 F | HEIGHT: 66 IN | BODY MASS INDEX: 23.98 KG/M2 | HEART RATE: 72 BPM | OXYGEN SATURATION: 100 %

## 2019-04-05 DIAGNOSIS — D72.10 MARKED EOSINOPHILIA: Primary | ICD-10-CM

## 2019-04-05 LAB
BASOPHILS # BLD AUTO: 0.14 10*3/MM3 (ref 0–0.2)
BASOPHILS NFR BLD AUTO: 1.6 % (ref 0–1.5)
DEPRECATED RDW RBC AUTO: 38 FL (ref 37–54)
EOSINOPHIL # BLD AUTO: 2.5 10*3/MM3 (ref 0–0.4)
EOSINOPHIL NFR BLD AUTO: 27.8 % (ref 0.3–6.2)
ERYTHROCYTE [DISTWIDTH] IN BLOOD BY AUTOMATED COUNT: 13.5 % (ref 12.3–15.4)
HCT VFR BLD AUTO: 38.3 % (ref 34–46.6)
HGB BLD-MCNC: 12.8 G/DL (ref 12–15.9)
IGA1 MFR SER: 308 MG/DL (ref 70–400)
IGG1 SER-MCNC: 1023 MG/DL (ref 700–1600)
IGM SERPL-MCNC: 98 MG/DL (ref 40–230)
IMM GRANULOCYTES # BLD AUTO: 0.03 10*3/MM3 (ref 0–0.05)
IMM GRANULOCYTES NFR BLD AUTO: 0.3 % (ref 0–0.5)
LYMPHOCYTES # BLD AUTO: 2.34 10*3/MM3 (ref 0.7–3.1)
LYMPHOCYTES NFR BLD AUTO: 26 % (ref 19.6–45.3)
MCH RBC QN AUTO: 26.4 PG (ref 26.6–33)
MCHC RBC AUTO-ENTMCNC: 33.4 G/DL (ref 31.5–35.7)
MCV RBC AUTO: 79.1 FL (ref 79–97)
MONOCYTES # BLD AUTO: 0.51 10*3/MM3 (ref 0.1–0.9)
MONOCYTES NFR BLD AUTO: 5.7 % (ref 5–12)
NEUTROPHILS # BLD AUTO: 3.48 10*3/MM3 (ref 1.4–7)
NEUTROPHILS NFR BLD AUTO: 38.6 % (ref 42.7–76)
NRBC BLD AUTO-RTO: 0 /100 WBC (ref 0–0)
PLATELET # BLD AUTO: 233 10*3/MM3 (ref 140–450)
PMV BLD AUTO: 11.2 FL (ref 6–12)
RBC # BLD AUTO: 4.84 10*6/MM3 (ref 3.77–5.28)
VIT B12 BLD-MCNC: 269 PG/ML (ref 211–946)
WBC NRBC COR # BLD: 9 10*3/MM3 (ref 3.4–10.8)

## 2019-04-05 PROCEDURE — 36415 COLL VENOUS BLD VENIPUNCTURE: CPT | Performed by: INTERNAL MEDICINE

## 2019-04-05 PROCEDURE — 85025 COMPLETE CBC W/AUTO DIFF WBC: CPT | Performed by: INTERNAL MEDICINE

## 2019-04-05 PROCEDURE — 99245 OFF/OP CONSLTJ NEW/EST HI 55: CPT | Performed by: INTERNAL MEDICINE

## 2019-04-05 PROCEDURE — 82607 VITAMIN B-12: CPT | Performed by: INTERNAL MEDICINE

## 2019-04-05 PROCEDURE — 82784 ASSAY IGA/IGD/IGG/IGM EACH: CPT | Performed by: INTERNAL MEDICINE

## 2019-04-05 NOTE — PROGRESS NOTES
"  Subjective     REASON FOR CONSULTATION:  eosinophilia  Provide an opinion on any further workup or treatment                             REQUESTING PHYSICIAN:  Dr. Orellana    RECORDS OBTAINED:  Records of the patients history including those obtained from the referring provider were reviewed and summarized in detail.    History of Present Illness   This is a pleasant but anxious 38-year-old woman from [place] seen today for evaluation of leukocytosis with eosinophilia.  The patient states that she was in her normal state of health until March 13, 2019 when after work she noticed some weakness in the bilateral lower extremities.  When she awakened the next morning she had significant pain and weakness in the lower extremities and also the upper extremities.  She called a physician through her insurance who stated that she probably had influenza.  Her symptoms persisted for 3-4 days and gradually improved with no obvious alleviating event other than time.  She describes the pain as extreme sensitivity in the muscles somewhat throbbing in nature severe when present.  She saw her primary care provider on 3/14/2019 at which time influenza testing was negative, sed rate 25, CRP 2.2, and a CK 49.  After initially feeling better, approximately 1 week later the patient again experienced some pain in the left arm and the right wrist and \"felt off\" for approximately 1 week which she describes as if she was on drugs.  She was seen again by her primary care provider on 3/27/2019 at which time a CBC showed a white blood cell count of 15.2, hemoglobin 12.5, platelets 435 and a manual differential showed eosinophilia with absolute eosinophil count 7.0 and also an increase in the basophils 0.3.  An GM was negative, CMP unremarkable except ALT 36 and a CK was repeated still normal 41.  A CBC was repeated on 4/1/2019 with a white blood cell count 17.17, hemoglobin 11.4, platelets 418 and a manual differential with absolute eosinophil " count 8.67 and normal basophils that visit.  She had a strong the low 80s antibody which was negative and a stool exam for ova and parasites which was negative.    Currently the patient feels well except anxiety and decreased appetite related to anxiety.  Her muscle pain and weakness have resolved.  She denies weight loss, fever or night sweats.  She denies lymphadenopathy.  She denies shortness of breath or cough.  She denies history of asthma or allergies.  She denies recent travel.  She denies drinking well water or camping.  She denies any new medications including over-the-counter medications, vitamins, natural products or herbs.  She denies drug use.  She denies sexual activity or any risk factors for HIV.  She does report history of menorrhagia.  This is unchanged.  She denies nausea, vomiting or diarrhea.    Past Medical History:   Diagnosis Date   • H/O Iron deficiency     Mild   • H/O Vitamin D deficiency    • Hyperlipidemia    • Thyroid disease         Past Surgical History:   Procedure Laterality Date   • APPENDECTOMY     • WISDOM TOOTH EXTRACTION      one only        No current outpatient medications on file prior to visit.     No current facility-administered medications on file prior to visit.         ALLERGIES:    Allergies   Allergen Reactions   • No Known Drug Allergy         Social History     Socioeconomic History   • Marital status: Single     Spouse name: Not on file   • Number of children: 0   • Years of education: Not on file   • Highest education level: Not on file   Occupational History   • Occupation: [employer]-     • Occupation:  () [place] [employer]     [employer]er: [employer]   Tobacco Use   • Smoking status: Never Smoker   • Smokeless tobacco: Never Used   • Tobacco comment: former social smoker when younger   Substance and Sexual Activity   • Alcohol use: Yes     Comment: occasionally during weekends   • Sexual activity: No     Birth  "control/protection: None        Family History   Problem Relation Age of Onset   • Hyperlipidemia Mother    • Diabetes Mother    • Hypertension Mother    • Other Father         prediabetes   • Hypertension Father    • Hyperlipidemia Father    • Diabetes Father    • Lung cancer Maternal Grandmother    • Aortic aneurysm Paternal Grandfather    • Heart disease Other    • Coronary artery disease Other    • Hyperlipidemia Other    • Hypertension Other    • Diabetes Other    • Colon cancer Neg Hx    • Breast cancer Neg Hx         Review of Systems   Constitutional: Negative.    HENT: Negative.    Respiratory: Negative.    Cardiovascular: Negative.    Gastrointestinal: Negative.    Endocrine: Negative.    Genitourinary: Negative.    Musculoskeletal: Negative.    Allergic/Immunologic: Negative.    Neurological: Negative.    Hematological: Negative.    Psychiatric/Behavioral: The patient is nervous/anxious.         Objective     Vitals:    04/05/19 1038   BP: 160/91   Pulse: 72   Resp: 14   Temp: 98.2 °F (36.8 °C)   TempSrc: Oral   SpO2: 100%   Weight: 67.7 kg (149 lb 3.2 oz)   Height: 166.4 cm (65.5\")  Comment: new ht   PainSc: 0-No pain     Current Status 4/5/2019   ECOG score 0       Physical Exam    CON: pleasant well-developed adult woman,  No distress  HEENT: no icterus, no thrush, moist membranes  NECK: no jvd, mild thyromegaly?  LYMPH: no cervical, supraclavicular or axillary lad  CV: RRR, S1S2, no murmur  RESP: cta bilat, no wheezing, no rales  GI: soft, non-tender, no splenomegaly, +bs  MUSC: no edema, normal gait  NEURO: alert and oriented x3, normal strength  PSYCH: normal mood  SKIN: scattered moles and tiny hemangiomas    RECENT LABS:  Hematology WBC   Date Value Ref Range Status   04/05/2019 9.00 3.40 - 10.80 10*3/mm3 Final   04/02/2019 11.8 (H) 3.4 - 10.8 x10E3/uL Final     RBC   Date Value Ref Range Status   04/05/2019 4.84 3.77 - 5.28 10*6/mm3 Final   04/02/2019 4.48 3.77 - 5.28 x10E6/uL Final     Comment: "     Polychromasia present  Ovalocytes present.       Hemoglobin   Date Value Ref Range Status   04/05/2019 12.8 12.0 - 15.9 g/dL Final     Hematocrit   Date Value Ref Range Status   04/05/2019 38.3 34.0 - 46.6 % Final     Platelets   Date Value Ref Range Status   04/05/2019 233 140 - 450 10*3/mm3 Final      Today's differential shows eosinophils 2.5, basophils 0.14, neutrophils 3.48, lymphocytes 2.34    Assessment/Plan     1.  Eosinophilia: The patient has a 1 month finding of eosinophilia which has been quite dramatic and initially associated with muscle pain and weakness which has resolved.  She denies recent travel, unusual diet, new medications or natural products, asthma or allergies.  Stool studies for parasites and a Strongyloides  test both have been negative.  She denies risk factors for HIV.  She has no known autoimmune disorders.  There is nothing to suggest adrenal insufficiency.  Neoplastic disorder such as primary hypereosinophilic syndromes, acute or chronic eosinophilic leukemias and other myeloproliferative/lymphoproliferative disorders remain on the differential but appear less likely given the resolution of her symptoms and the improved eosinophil count at today's visit.  I cautiously reassured the patient.    To further evaluate, today I have ordered an ANCA panel, quantitative immunoglobulins including an IgE, protein electrophoresis, B12, and tryptase level.  Given the previous findings of concurrent mild basophilia I am going to check a FISH for CML and also a peripheral blood flow cytometry.    If these tests are negative, given the patient is symptomatically improving and her eosinophil count dropping we will keep a close observation with a repeat CBC in 3-4 weeks.  If, however, she has a recurrence of her muscle pain and/or weakness she is to call the office promptly for reevaluation.  If her eosinophil count re-escalates she would need further testing including a bone marrow exam, studies  for FIP1 L1-PDGFRA -PDGFRB, -FGFR1 rearrangements, JAK2 for further evaluation of hematologic malignancies and CT chest, abdomen, pelvis.

## 2019-04-05 NOTE — PROGRESS NOTES
Call patient with results:    -Stool studies were negative for ova & parasites.  -Blood test for round worms were negative  -Peripheral blood smear shows predominant eosinophilia     **VERIFY her appointment date with hematology.

## 2019-04-05 NOTE — TELEPHONE ENCOUNTER
----- Message from Kenyon Orellana MD sent at 4/5/2019  6:49 AM EDT -----  Call patient with results:    -Stool studies were negative for ova & parasites.  -Blood test for round worms were negative  -Peripheral blood smear shows predominant eosinophilia     ##VERIFY her appointment date with hematology.

## 2019-04-07 LAB — TRYPTASE SERPL-MCNC: 2.1 UG/L (ref 2.2–13.2)

## 2019-04-08 LAB
ALBUMIN SERPL-MCNC: 4 G/DL (ref 2.9–4.4)
ALBUMIN/GLOB SERPL: 1.1 {RATIO} (ref 0.7–1.7)
ALPHA1 GLOB FLD ELPH-MCNC: 0.3 G/DL (ref 0–0.4)
ALPHA2 GLOB SERPL ELPH-MCNC: 0.9 G/DL (ref 0.4–1)
B-GLOBULIN SERPL ELPH-MCNC: 1.3 G/DL (ref 0.7–1.3)
C-ANCA TITR SER IF: NORMAL TITER
GAMMA GLOB SERPL ELPH-MCNC: 1.1 G/DL (ref 0.4–1.8)
GLOBULIN SER CALC-MCNC: 3.7 G/DL (ref 2.2–3.9)
Lab: NORMAL
M-SPIKE: NORMAL G/DL
MYELOPEROXIDASE AB SER-ACNC: <9 U/ML (ref 0–9)
P-ANCA ATYPICAL TITR SER IF: NORMAL TITER
P-ANCA TITR SER IF: NORMAL TITER
PROT SERPL-MCNC: 7.7 G/DL (ref 6–8.5)
PROTEINASE3 AB SER IA-ACNC: <3.5 U/ML (ref 0–3.5)
REF LAB TEST METHOD: NORMAL

## 2019-04-09 LAB — REF LAB TEST METHOD: NORMAL

## 2019-04-10 ENCOUNTER — TELEPHONE (OUTPATIENT)
Dept: ONCOLOGY | Facility: HOSPITAL | Age: 39
End: 2019-04-10

## 2019-04-10 NOTE — TELEPHONE ENCOUNTER
----- Message from José Ernandez sent at 4/10/2019 12:52 PM EDT -----  Contact: [phone]  Results of labs    Pt calling for lab results. Pt had several labs drawn last week that require MD interpretation. Advised pt that I would have Dr. Lerma call her with results. She v/u. Message sent to Dr. Lerma.

## 2019-04-10 NOTE — TELEPHONE ENCOUNTER
----- Message from José Ernandez sent at 4/10/2019 12:52 PM EDT -----  Contact: [phone]  Results of labs

## 2019-04-10 NOTE — TELEPHONE ENCOUNTER
Called pt and informed her of Dr. Lerma's message. She v/u.     ----- Message from Nic Lerma MD sent at 4/10/2019  2:56 PM EDT -----  All normal so far.  ----- Message -----  From: Natalia Perez RN  Sent: 4/10/2019   2:28 PM  To: MD Dr. Florentin Fowler,   Pt was requesting you call regarding her lab results. It looks like all results are back. Pt states if you cannot get a hold of her, you can call her sister with results.     Her #:  [phone]  Sister #: [phone]

## 2019-04-17 LAB — TOTAL IGE SMQN RAST: 248 IU/ML (ref 6–495)

## 2019-04-19 DIAGNOSIS — Z00.00 PREVENTATIVE HEALTH CARE: ICD-10-CM

## 2019-04-22 LAB
ALBUMIN SERPL-MCNC: 4.6 G/DL (ref 3.5–5.2)
ALBUMIN/GLOB SERPL: 1.8 G/DL
ALP SERPL-CCNC: 63 U/L (ref 39–117)
ALT SERPL-CCNC: 11 U/L (ref 1–33)
APPEARANCE UR: ABNORMAL
AST SERPL-CCNC: 11 U/L (ref 1–32)
BACTERIA #/AREA URNS HPF: ABNORMAL /HPF
BASOPHILS # BLD AUTO: 0.09 10*3/MM3 (ref 0–0.2)
BASOPHILS NFR BLD AUTO: 1.6 % (ref 0–1.5)
BILIRUB SERPL-MCNC: 0.4 MG/DL (ref 0.2–1.2)
BILIRUB UR QL STRIP: NEGATIVE
BUN SERPL-MCNC: 10 MG/DL (ref 6–20)
BUN/CREAT SERPL: 13.5 (ref 7–25)
CALCIUM SERPL-MCNC: 9.8 MG/DL (ref 8.6–10.5)
CHLORIDE SERPL-SCNC: 106 MMOL/L (ref 98–107)
CHOLEST SERPL-MCNC: 160 MG/DL (ref 0–200)
CHOLEST/HDLC SERPL: 3.48 {RATIO}
CO2 SERPL-SCNC: 23.1 MMOL/L (ref 22–29)
COLOR UR: ABNORMAL
CREAT SERPL-MCNC: 0.74 MG/DL (ref 0.57–1)
EOSINOPHIL # BLD AUTO: 0.41 10*3/MM3 (ref 0–0.4)
EOSINOPHIL NFR BLD AUTO: 7.3 % (ref 0.3–6.2)
EPI CELLS #/AREA URNS HPF: ABNORMAL /HPF
ERYTHROCYTE [DISTWIDTH] IN BLOOD BY AUTOMATED COUNT: 14.7 % (ref 12.3–15.4)
GLOBULIN SER CALC-MCNC: 2.5 GM/DL
GLUCOSE SERPL-MCNC: 110 MG/DL (ref 65–99)
GLUCOSE UR QL: NEGATIVE
HBA1C MFR BLD: 5.5 % (ref 4.8–5.6)
HCT VFR BLD AUTO: 38.5 % (ref 34–46.6)
HDLC SERPL-MCNC: 46 MG/DL (ref 40–60)
HGB BLD-MCNC: 11.4 G/DL (ref 12–15.9)
HGB UR QL STRIP: ABNORMAL
IMM GRANULOCYTES # BLD AUTO: 0.01 10*3/MM3 (ref 0–0.05)
IMM GRANULOCYTES NFR BLD AUTO: 0.2 % (ref 0–0.5)
KETONES UR QL STRIP: ABNORMAL
LDLC SERPL CALC-MCNC: 102 MG/DL (ref 0–100)
LEUKOCYTE ESTERASE UR QL STRIP: NEGATIVE
LYMPHOCYTES # BLD AUTO: 1.68 10*3/MM3 (ref 0.7–3.1)
LYMPHOCYTES NFR BLD AUTO: 29.9 % (ref 19.6–45.3)
MCH RBC QN AUTO: 25.2 PG (ref 26.6–33)
MCHC RBC AUTO-ENTMCNC: 29.6 G/DL (ref 31.5–35.7)
MCV RBC AUTO: 85.2 FL (ref 79–97)
MONOCYTES # BLD AUTO: 0.35 10*3/MM3 (ref 0.1–0.9)
MONOCYTES NFR BLD AUTO: 6.2 % (ref 5–12)
NEUTROPHILS # BLD AUTO: 3.08 10*3/MM3 (ref 1.7–7)
NEUTROPHILS NFR BLD AUTO: 54.8 % (ref 42.7–76)
NITRITE UR QL STRIP: NEGATIVE
NRBC BLD AUTO-RTO: 0.2 /100 WBC (ref 0–0.2)
PH UR STRIP: 5.5 [PH] (ref 5–8)
PLATELET # BLD AUTO: 375 10*3/MM3 (ref 140–450)
POTASSIUM SERPL-SCNC: 4.5 MMOL/L (ref 3.5–5.2)
PROT SERPL-MCNC: 7.1 G/DL (ref 6–8.5)
PROT UR QL STRIP: ABNORMAL
RBC # BLD AUTO: 4.52 10*6/MM3 (ref 3.77–5.28)
RBC #/AREA URNS HPF: ABNORMAL /HPF
SODIUM SERPL-SCNC: 143 MMOL/L (ref 136–145)
SP GR UR: ABNORMAL (ref 1–1.03)
T4 FREE SERPL-MCNC: 1.25 NG/DL (ref 0.93–1.7)
TRIGL SERPL-MCNC: 62 MG/DL (ref 0–150)
TSH SERPL DL<=0.005 MIU/L-ACNC: 1.36 MIU/ML (ref 0.27–4.2)
UROBILINOGEN UR STRIP-MCNC: ABNORMAL MG/DL
VLDLC SERPL CALC-MCNC: 12.4 MG/DL
WBC # BLD AUTO: 5.62 10*3/MM3 (ref 3.4–10.8)
WBC #/AREA URNS HPF: ABNORMAL /HPF

## 2019-05-03 ENCOUNTER — LAB (OUTPATIENT)
Dept: LAB | Facility: HOSPITAL | Age: 39
End: 2019-05-03

## 2019-05-03 ENCOUNTER — OFFICE VISIT (OUTPATIENT)
Dept: ONCOLOGY | Facility: CLINIC | Age: 39
End: 2019-05-03

## 2019-05-03 VITALS
WEIGHT: 152.2 LBS | DIASTOLIC BLOOD PRESSURE: 87 MMHG | RESPIRATION RATE: 12 BRPM | HEART RATE: 72 BPM | BODY MASS INDEX: 24.46 KG/M2 | OXYGEN SATURATION: 100 % | TEMPERATURE: 98.1 F | SYSTOLIC BLOOD PRESSURE: 131 MMHG | HEIGHT: 66 IN

## 2019-05-03 DIAGNOSIS — D72.10 MARKED EOSINOPHILIA: Primary | ICD-10-CM

## 2019-05-03 DIAGNOSIS — D50.0 IRON DEFICIENCY ANEMIA DUE TO CHRONIC BLOOD LOSS: ICD-10-CM

## 2019-05-03 PROBLEM — D50.9 IRON DEFICIENCY ANEMIA: Status: ACTIVE | Noted: 2019-05-03

## 2019-05-03 LAB
BASOPHILS # BLD AUTO: 0.1 10*3/MM3 (ref 0–0.2)
BASOPHILS NFR BLD AUTO: 1.2 % (ref 0–1.5)
DEPRECATED RDW RBC AUTO: 39.4 FL (ref 37–54)
EOSINOPHIL # BLD AUTO: 0.37 10*3/MM3 (ref 0–0.4)
EOSINOPHIL NFR BLD AUTO: 4.6 % (ref 0.3–6.2)
ERYTHROCYTE [DISTWIDTH] IN BLOOD BY AUTOMATED COUNT: 13.6 % (ref 12.3–15.4)
HCT VFR BLD AUTO: 34.1 % (ref 34–46.6)
HGB BLD-MCNC: 11.1 G/DL (ref 12–15.9)
IMM GRANULOCYTES # BLD AUTO: 0.04 10*3/MM3 (ref 0–0.05)
IMM GRANULOCYTES NFR BLD AUTO: 0.5 % (ref 0–0.5)
LYMPHOCYTES # BLD AUTO: 2.48 10*3/MM3 (ref 0.7–3.1)
LYMPHOCYTES NFR BLD AUTO: 30.9 % (ref 19.6–45.3)
MCH RBC QN AUTO: 26.1 PG (ref 26.6–33)
MCHC RBC AUTO-ENTMCNC: 32.6 G/DL (ref 31.5–35.7)
MCV RBC AUTO: 80 FL (ref 79–97)
MONOCYTES # BLD AUTO: 0.52 10*3/MM3 (ref 0.1–0.9)
MONOCYTES NFR BLD AUTO: 6.5 % (ref 5–12)
NEUTROPHILS # BLD AUTO: 4.51 10*3/MM3 (ref 1.7–7)
NEUTROPHILS NFR BLD AUTO: 56.3 % (ref 42.7–76)
NRBC BLD AUTO-RTO: 0 /100 WBC (ref 0–0.2)
PLATELET # BLD AUTO: 361 10*3/MM3 (ref 140–450)
PMV BLD AUTO: 10 FL (ref 6–12)
RBC # BLD AUTO: 4.26 10*6/MM3 (ref 3.77–5.28)
WBC NRBC COR # BLD: 8.02 10*3/MM3 (ref 3.4–10.8)

## 2019-05-03 PROCEDURE — 85025 COMPLETE CBC W/AUTO DIFF WBC: CPT | Performed by: INTERNAL MEDICINE

## 2019-05-03 PROCEDURE — 99214 OFFICE O/P EST MOD 30 MIN: CPT | Performed by: INTERNAL MEDICINE

## 2019-05-03 PROCEDURE — 36415 COLL VENOUS BLD VENIPUNCTURE: CPT | Performed by: INTERNAL MEDICINE

## 2019-05-03 NOTE — PROGRESS NOTES
"  Subjective     REASON FOR FOLLOW-UP:  eosinophilia                               REQUESTING PHYSICIAN:  Dr. Orellana    RECORDS OBTAINED:  Records of the patients history including those obtained from the referring provider were reviewed and summarized in detail.    History of Present Illness   This is a pleasant but anxious 38-year-old woman from [place] seen today for evaluation of leukocytosis with eosinophilia.  The patient states that she was in her normal state of health until March 13, 2019 when after work she noticed some weakness in the bilateral lower extremities.  When she awakened the next morning she had significant pain and weakness in the lower extremities and also the upper extremities.  She called a physician through her insurance who stated that she probably had influenza.  Her symptoms persisted for 3-4 days and gradually improved with no obvious alleviating event other than time.  She describes the pain as extreme sensitivity in the muscles somewhat throbbing in nature severe when present.  She saw her primary care provider on 3/14/2019 at which time influenza testing was negative, sed rate 25, CRP 2.2, and a CK 49.  After initially feeling better, approximately 1 week later the patient again experienced some pain in the left arm and the right wrist and \"felt off\" for approximately 1 week which she describes as if she was on drugs.  She was seen again by her primary care provider on 3/27/2019 at which time a CBC showed a white blood cell count of 15.2, hemoglobin 12.5, platelets 435 and a manual differential showed eosinophilia with absolute eosinophil count 7.0 and also an increase in the basophils 0.3.  An GM was negative, CMP unremarkable except ALT 36 and a CK was repeated still normal 41.  A CBC was repeated on 4/1/2019 with a white blood cell count 17.17, hemoglobin 11.4, platelets 418 and a manual differential with absolute eosinophil count 8.67 and normal basophils that visit.  She had a " strong the low 80s antibody which was negative and a stool exam for ova and parasites which was negative.    She was seen 4/5/19 feeling well except decreased appetite related to anxiety.  Her muscle pain and weakness had resolved.  She denied weight loss, fever or night sweats.  She denied lymphadenopathy.  She denied shortness of breath or cough.  She denied history of asthma or allergies.  She denied recent travel.  She denied drinking well water or camping.  She denied any new medications including over-the-counter medications, vitamins, natural products or herbs.  She denied drug use.  She denied sexual activity or any risk factors for HIV.  She does report history of menorrhagia.  This is unchanged.  She denies nausea, vomiting or diarrhea.    As of 4/5/2019 her CBC was improving with an absolute eosinophil count 2.5.  Quantitative immunoglobulins including IgE were within normal limits.  Protein electrophoresis showed no M spike.  A serum tryptase level was not elevated at 2.1.  B12 was low normal to 69.  Peripheral blood flow cytometry did not show any abnormal populations of B cells or T cells.  BCR-ABL by FISH was negative.  C-ANCA and P-ANCA titers were negative.    In the interim, she had a repeat CBC on 4/22/2019 which showed further improvement in the eosinophils to 0.41    She return today for follow-up.  She feels well with no recurrence of the previous myalgias or weakness.  She denies fever.  She denies lymphadenopathy.  She is without concerns today.      Past Medical History:   Diagnosis Date   • H/O foreign travel 10/2018    Michaelle, Charles 09/2018   • H/O Iron deficiency     Mild   • H/O Vitamin D deficiency    • Hyperlipidemia    • Thyroid disease         Past Surgical History:   Procedure Laterality Date   • APPENDECTOMY     • WISDOM TOOTH EXTRACTION      one only        No current outpatient medications on file prior to visit.     No current facility-administered medications on file prior to  "visit.         ALLERGIES:    Allergies   Allergen Reactions   • No Known Drug Allergy         Social History     Socioeconomic History   • Marital status: Single     Spouse name: Not on file   • Number of children: 0   • Years of education: College   • Highest education level: Not on file   Occupational History   • Occupation: [employer]-     • Occupation:  () [place] [employer]     Employer: [employer]   Tobacco Use   • Smoking status: Never Smoker   • Smokeless tobacco: Never Used   • Tobacco comment: former social smoker when younger   Substance and Sexual Activity   • Alcohol use: Yes     Comment: occasionally during weekends   • Drug use: No   • Sexual activity: No     Birth control/protection: None        Family History   Problem Relation Age of Onset   • Hyperlipidemia Mother    • Diabetes Mother    • Hypertension Mother    • Other Father         prediabetes   • Hypertension Father    • Hyperlipidemia Father    • Diabetes Father    • Lung cancer Maternal Grandmother    • Aortic aneurysm Paternal Grandfather    • Heart disease Other    • Coronary artery disease Other    • Hyperlipidemia Other    • Hypertension Other    • Diabetes Other    • Colon cancer Neg Hx    • Breast cancer Neg Hx         Review of Systems   Constitutional: Negative.    HENT: Negative.    Respiratory: Negative.    Cardiovascular: Negative.    Gastrointestinal: Negative.    Endocrine: Negative.    Genitourinary: Negative.    Musculoskeletal: Negative.    Allergic/Immunologic: Negative.    Neurological: Negative.    Hematological: Negative.    Psychiatric/Behavioral: The patient is not nervous/anxious.         Objective     Vitals:    05/03/19 0753   BP: 131/87   Pulse: 72   Resp: 12   Temp: 98.1 °F (36.7 °C)   TempSrc: Oral   SpO2: 100%   Weight: 69 kg (152 lb 3.2 oz)   Height: 166.4 cm (65.51\")   PainSc: 0-No pain     Current Status 5/3/2019   ECOG score 0       Physical Exam    CON: " pleasant well-developed adult woman,  No distress  HEENT: no icterus, no thrush, moist membranes  NECK: no jvd, mild thyromegaly?  LYMPH: no cervical, supraclavicular or axillary lad  CV: RRR, S1S2, no murmur  RESP: cta bilat, no wheezing, no rales  GI: soft, non-tender, no splenomegaly, +bs  MUSC: no edema, normal gait  NEURO: alert and oriented x3, normal strength  PSYCH: normal mood  SKIN: scattered moles and tiny hemangiomas  Exam is unchanged-5/3/2019  RECENT LABS:  Hematology WBC   Date Value Ref Range Status   05/03/2019 8.02 3.40 - 10.80 10*3/mm3 Final   04/22/2019 5.62 3.40 - 10.80 10*3/mm3 Final     RBC   Date Value Ref Range Status   05/03/2019 4.26 3.77 - 5.28 10*6/mm3 Final   04/22/2019 4.52 3.77 - 5.28 10*6/mm3 Final     Hemoglobin   Date Value Ref Range Status   05/03/2019 11.1 (L) 12.0 - 15.9 g/dL Final     Hematocrit   Date Value Ref Range Status   05/03/2019 34.1 34.0 - 46.6 % Final     Platelets   Date Value Ref Range Status   05/03/2019 361 140 - 450 10*3/mm3 Final      Other labs reviewed as per the assessment/plan below    Assessment/Plan     1.  Eosinophilia: The patient has a previous hisotry (March 2019) of a 1 month finding of eosinophilia which has been quite dramatic and initially associated with muscle pain and weakness which has resolved.  She denies recent travel, unusual diet, new medications or natural products, asthma or allergies.  Stool studies for parasites and a Strongyloides  test both have been negative.  She denies risk factors for HIV.  She has no known autoimmune disorders.  There is nothing to suggest adrenal insufficiency.  Neoplastic disorder such as primary hypereosinophilic syndromes, acute or chronic eosinophilic leukemias and other myeloproliferative/lymphoproliferative disorders remain on the differential but appear less likely given the resolution of her symptoms and the improved eosinophil count.    Further evaluation with IgE, protein electrophoresis, tryptase,  B12, peripheral blood flow cytometry, BCR-ABL FISH and ANCA titers on 4/5/2019 all returned within normal range.    In return on 5/3/2019 the patient is completely asymptomatic with no recurrence of her muscle pain or weakness.  Her CBC reviewed today shows normalization of the eosinophil count.  I will defer her care back to her primary care provider.  If, however, she has a recurrence of her muscle pain and/or weakness she is to call the office promptly for reevaluation.  If her eosinophil count re-escalates she would need further testing including a bone marrow exam, studies for FIP1 L1-PDGFRA -PDGFRB, -FGFR1 rearrangements, JAK2 for further evaluation of hematologic malignancies and CT chest, abdomen, pelvis.      2.  Iron deficiency anemia, worse: The patient has microcytosis of the red blood cells and reports heavy menstrual cycles.  She is almost certainly iron deficient.  I recommended she take an over-the-counter iron supplement every other day.

## 2019-05-08 ENCOUNTER — OFFICE VISIT (OUTPATIENT)
Dept: INTERNAL MEDICINE | Age: 39
End: 2019-05-08

## 2019-05-08 VITALS
BODY MASS INDEX: 24.43 KG/M2 | DIASTOLIC BLOOD PRESSURE: 70 MMHG | WEIGHT: 152 LBS | HEIGHT: 66 IN | OXYGEN SATURATION: 99 % | SYSTOLIC BLOOD PRESSURE: 110 MMHG | HEART RATE: 70 BPM | TEMPERATURE: 98.2 F

## 2019-05-08 DIAGNOSIS — E55.9 VITAMIN D DEFICIENCY: ICD-10-CM

## 2019-05-08 DIAGNOSIS — Z00.00 ENCOUNTER FOR ANNUAL HEALTH EXAMINATION: Primary | ICD-10-CM

## 2019-05-08 DIAGNOSIS — D72.10 MARKED EOSINOPHILIA: ICD-10-CM

## 2019-05-08 PROBLEM — R73.09 ELEVATED HEMOGLOBIN A1C: Status: ACTIVE | Noted: 2017-12-29

## 2019-05-08 PROCEDURE — 99395 PREV VISIT EST AGE 18-39: CPT | Performed by: INTERNAL MEDICINE

## 2019-05-08 RX ORDER — ACETAMINOPHEN 160 MG
2000 TABLET,DISINTEGRATING ORAL DAILY
COMMUNITY
Start: 2019-05-08

## 2019-05-08 RX ORDER — MULTIVITAMIN/IRON/FOLIC ACID 18MG-0.4MG
1 TABLET ORAL DAILY
COMMUNITY
Start: 2019-05-08

## 2019-05-08 NOTE — ASSESSMENT & PLAN NOTE
Eosinophilia has resolved. Will monitor for any symptoms. Will repeat complete blood count in 6 months.

## 2019-05-08 NOTE — ASSESSMENT & PLAN NOTE
Resume vitamin D3 4000 IU daily x 1 month then 2000 IU daily for maintenance. Will recheck vitamin D on follow-up in 6 months.

## 2019-05-08 NOTE — PROGRESS NOTES
[facility]  MARCELO GILLIS M.D.      Gia Graves / 38 y.o. / female  05/08/2019    CC:  Annual Exam (1/8/2018 last)      HPI:      Gia presents for annual health maintenance visit.  Seen by CBC and most recently labs showed resolution of marked eosinophilia. She feels back to normal otherwise.     · Last health maintenance visit: 1 year ago  · General health: fair  · Lifestyle:  · Attempting to lose weight?: No   · Diet: adequate/fair  · Exercise: has not been exercising/staying active recently  · Tobacco: Never used   · Alcohol: occasional/rare  · Work: Full-time  · Reproductive health:  · Sexually active?: No   · Sexual problems?: No problems  · Concern for STD?: No    · Sees Gynecologist?: Yes   · Rosana/Postmenopausal?: No   · Domestic abuse concerns: No   · Depression Screening:      PHQ-2/PHQ-9 Depression Screening 4/1/2019   Little interest or pleasure in doing things 0   Feeling down, depressed, or hopeless 0   Total Score 0         PHQ-2: 0 (Not depressed)   PHQ-9:     Patient Care Team:  Kenyon Gillis MD as PCP - General (Internal Medicine)  Nic Lerma MD as Consulting Physician (Hematology and Oncology)  ______________________________________________________________________    ALLERGIES  Allergies   Allergen Reactions   • No Known Drug Allergy         MEDICATIONS  No medications     PFSH:     The following portions of the patient's history were reviewed and updated as appropriate: Allergies / Current Medications / Past Medical History / Surgical History / Social History / Family History    PROBLEM LIST   Patient Active Problem List   Diagnosis   • Mixed hyperlipidemia   • Vitamin D deficiency   • Elevated hemoglobin A1c   • Marked eosinophilia   • Iron deficiency anemia       PAST MEDICAL HISTORY  Past Medical History:   Diagnosis Date   • H/O foreign travel 10/2018    Michaelle, Charles 09/2018   • H/O Iron deficiency     Mild   • H/O Vitamin D deficiency    • Hyperlipidemia    • Thyroid  disease        SURGICAL HISTORY  Past Surgical History:   Procedure Laterality Date   • APPENDECTOMY     • WISDOM TOOTH EXTRACTION      one only       SOCIAL HISTORY  Social History     Socioeconomic History   • Marital status: Single     Spouse name: Not on file   • Number of children: 0   • Years of education: College   • Highest education level: Not on file   Occupational History   • Occupation: [employer]-     • Occupation: () [place] [employer]     Employer: [employer]   Tobacco Use   • Smoking status: Never Smoker   • Smokeless tobacco: Never Used   • Tobacco comment: former social smoker when younger   Substance and Sexual Activity   • Alcohol use: Yes     Comment: occasionally during weekends   • Drug use: No   • Sexual activity: No     Birth control/protection: None   Lifestyle   • Physical activity:     Days per week: 0 days     Minutes per session: Not on file   • Stress: Very much       FAMILY HISTORY  Family History   Problem Relation Age of Onset   • Hyperlipidemia Mother    • Diabetes Mother    • Hypertension Mother    • Hypertension Father    • Hyperlipidemia Father    • Diabetes Father    • Lung cancer Maternal Grandmother         smoker    • Aortic aneurysm Paternal Grandfather    • No Known Problems Sister    • No Known Problems Brother    • Colon cancer Neg Hx    • Breast cancer Neg Hx        IMMUNIZATION HISTORY  Immunization History   Administered Date(s) Administered   • Td 02/25/2010       ______________________________________________________________________    REVIEW OF SYSTEMS    Review of Systems   Constitutional: Negative.    HENT: Negative.    Eyes: Negative.    Respiratory: Negative.    Cardiovascular: Negative.    Gastrointestinal: Negative.    Endocrine: Negative.    Genitourinary: Negative.  Negative for menstrual problem. Hematuria: no gross hematuria.   Musculoskeletal: Negative.  Negative for myalgias.   Skin: Negative.  Negative  "for rash.   Allergic/Immunologic: Negative.    Neurological: Negative.    Hematological: Negative.  Negative for adenopathy. Does not bruise/bleed easily.   Psychiatric/Behavioral: Negative.         Stress high          VITALS:    Visit Vitals  /70   Pulse 70   Temp 98.2 °F (36.8 °C)   Ht 166.4 cm (65.51\")   Wt 68.9 kg (152 lb)   SpO2 99%   BMI 24.90 kg/m²       BP Readings from Last 3 Encounters:   05/08/19 110/70   05/03/19 131/87   04/05/19 160/91     Wt Readings from Last 3 Encounters:   05/08/19 68.9 kg (152 lb)   05/03/19 69 kg (152 lb 3.2 oz)   04/05/19 67.7 kg (149 lb 3.2 oz)      Body mass index is 24.9 kg/m².    PHYSICAL EXAMINATION    Physical Exam   Constitutional: She is oriented to person, place, and time. She appears well-developed and well-nourished. No distress.   HENT:   Head: Normocephalic and atraumatic.   Right Ear: External ear normal.   Left Ear: External ear normal.   Nose: Nose normal.   Mouth/Throat: Oropharynx is clear and moist.   Eyes: Conjunctivae and EOM are normal. Pupils are equal, round, and reactive to light. No scleral icterus.   Neck: Normal range of motion. Neck supple. No tracheal deviation present. No thyroid mass and no thyromegaly present.   Cardiovascular: Normal rate, regular rhythm, normal heart sounds and intact distal pulses.   Pulmonary/Chest: Effort normal and breath sounds normal.   Abdominal: Soft. Bowel sounds are normal. She exhibits no distension and no mass. There is no tenderness. No hernia.   Genitourinary:   Genitourinary Comments: Deferred to gyne/by patient unless specified otherwise.    Musculoskeletal: She exhibits no edema or deformity.   Neurological: She is alert and oriented to person, place, and time. She has normal reflexes. No cranial nerve deficit. She exhibits normal muscle tone. Coordination normal.   Skin: Skin is warm. No rash noted. No pallor.   Psychiatric: She has a normal mood and affect. Her behavior is normal. Judgment and thought " content normal.         REVIEWED DATA    Labs:    Lab Results   Component Value Date     04/22/2019    K 4.5 04/22/2019    CALCIUM 9.8 04/22/2019    AST 11 04/22/2019    ALT 11 04/22/2019    BUN 10 04/22/2019    CREATININE 0.74 04/22/2019    CREATININE 0.82 03/27/2019    CREATININE CANCELED 03/14/2019    EGFRIFNONA 88 04/22/2019    EGFRIFAFRI 107 04/22/2019       Lab Results   Component Value Date    HGBA1C 5.50 04/22/2019    HGBA1C 5.69 (H) 01/02/2018    HGBA1C 5.8 (H) 05/06/2015    TSH 1.360 04/22/2019    FREET4 1.25 04/22/2019       Lab Results   Component Value Date     (H) 04/22/2019    HDL 46 04/22/2019    TRIG 62 04/22/2019    CHOLHDLRATIO 3.48 04/22/2019       Lab Results   Component Value Date    JFQB81JS 22.9 (L) 03/27/2019        Lab Results   Component Value Date    WBC 8.02 05/03/2019    HGB 11.1 (L) 05/03/2019    MCV 80.0 05/03/2019     05/03/2019     CBC Auto Differential   Order: [id number] - Part of Panel Order [id number]   Status:  Final result   Visible to patient:  Yes (MyChart) Dx:  Marked eosinophilia    Ref Range & Units 5d ago   WBC 3.40 - 10.80 10*3/mm3 8.02    RBC 3.77 - 5.28 10*6/mm3 4.26    Hemoglobin 12.0 - 15.9 g/dL 11.1 Abnormally low     Hematocrit 34.0 - 46.6 % 34.1    MCV 79.0 - 97.0 fL 80.0    MCH 26.6 - 33.0 pg 26.1 Abnormally low     MCHC 31.5 - 35.7 g/dL 32.6    RDW 12.3 - 15.4 % 13.6    RDW-SD 37.0 - 54.0 fl 39.4    MPV 6.0 - 12.0 fL 10.0    Platelets 140 - 450 10*3/mm3 361    Neutrophil % 42.7 - 76.0 % 56.3    Lymphocyte % 19.6 - 45.3 % 30.9    Monocyte % 5.0 - 12.0 % 6.5    Eosinophil % 0.3 - 6.2 % 4.6    Basophil % 0.0 - 1.5 % 1.2    Immature Grans % 0.0 - 0.5 % 0.5    Neutrophils, Absolute 1.70 - 7.00 10*3/mm3 4.51    Lymphocytes, Absolute 0.70 - 3.10 10*3/mm3 2.48    Monocytes, Absolute 0.10 - 0.90 10*3/mm3 0.52    Eosinophils, Absolute 0.00 - 0.40 10*3/mm3 0.37    Basophils, Absolute 0.00 - 0.20 10*3/mm3 0.10    Immature Grans, Absolute 0.00 - 0.05  10*3/mm3 0.04    nRBC 0.0 - 0.2 /100 WBC 0.0    Resulting Agency   [facility]         Specimen Collected: 05/03/19 07:39             Lab Results   Component Value Date    PROTEIN Trace (A) 04/22/2019    GLUCOSEU Negative 04/22/2019    BLOODU See below: (A) 04/22/2019    NITRITEU Negative 04/22/2019    LEUKOCYTESUR Negative 04/22/2019        No results found for: HEPCVIRUSABY    Imaging:        Medical Tests:        ______________________________________________________________________    ASSESSMENT & PLAN    ANNUAL WELLNESS EXAM / PHYSICAL     Other medical problems addressed today:  Problem List Items Addressed This Visit        Medium    Vitamin D deficiency (Chronic)    Current Assessment & Plan     Resume vitamin D3 4000 IU daily x 1 month then 2000 IU daily for maintenance. Will recheck vitamin D on follow-up in 6 months.          Relevant Medications    Cholecalciferol (VITAMIN D3) 2000 units capsule    Marked eosinophilia    Current Assessment & Plan     Eosinophilia has resolved. Will monitor for any symptoms. Will repeat complete blood count in 6 months.            Other Visit Diagnoses     Encounter for annual health examination    -  Primary          Summary/Discussion:         Next Appointment with me: Visit date not found    Return in about 6 months (around 11/8/2019) for eosinophilia, vit d def.    HEALTHCARE MAINTENANCE ISSUES:    Cancer Screening:  · Colon: Initial/Next screening at age: 46  · Repeat colon cancer screening: N/A at this time  · Breast: Recommended monthly self exams; annual professional exam  · Mammogram: N/A at this time  · Cervical: 2 years, 3 years ; instructed to see gyne  · Skin: Monthly self skin examination, annual exam by health professional  · Lung: Does not meet criteria for lung cancer screening.   · Other:    Screening Labs & Tests:  · Lab results reviewed & discussed with the patient or test orders placed today.  · EKG:  · Vascular Screening:   · DEXA (65+ or  postmenopausal with risk factors):   · HEP C (If born [birth year]-05[birth year], or risk factors): Not indicated    Immunization/Vaccinations (to be given today unless deferred by patient)  · Influenza: Patient deferred/declined flu vaccine (recommended annual vaccination)  · Hepatitis A: Verify immunization records  · Tetanus/Pertussis: Verify immunization records  · Pneumovax: Not needed at this time  · Prevnar 13: Not needed at this time  · Shingles: Not needed at this time  · Other:     Lifestyle Counseling:  · Lifestyle Modifications: Begin progressive aerobic exercise program 3-5 days a week, Maintain a low sugar/carbohydrate diet, Follow a low fat, low cholesterol diet, Maintain low sodium diet (< 3 gm) for blood pressure, Reduce exposure to stress and Manage stress/anxiety better  · Safety Issues: Always wear seatbelt, Avoid texting while driving   · Use sunscreen, regular skin examination  · Recommended annual dental/vision examination.  · Emotional/Stress/Sleep: Reviewed and  given when appropriate      Health Maintenance   Topic Date Due   • TDAP/TD VACCINES (1 - Tdap) 02/26/2010   • INFLUENZA VACCINE  08/01/2019   • PAP SMEAR  11/16/2019   • LIPID PANEL  04/22/2020   • ANNUAL PHYSICAL  05/09/2020         **Dragon Disclaimer:   Much of this encounter note is an electronic transcription/translation of spoken language to printed text. The electronic translation of spoken language may permit erroneous, or at times, nonsensical words or phrases to be inadvertently transcribed. Although I have reviewed the note for such errors, some may still exist.    Template created by Brook Orellana MD

## 2019-08-13 ENCOUNTER — OFFICE VISIT (OUTPATIENT)
Dept: INTERNAL MEDICINE | Age: 39
End: 2019-08-13

## 2019-08-13 VITALS
OXYGEN SATURATION: 99 % | BODY MASS INDEX: 24.59 KG/M2 | SYSTOLIC BLOOD PRESSURE: 120 MMHG | WEIGHT: 153 LBS | HEIGHT: 66 IN | TEMPERATURE: 98.2 F | DIASTOLIC BLOOD PRESSURE: 84 MMHG | HEART RATE: 71 BPM

## 2019-08-13 DIAGNOSIS — R10.2 PELVIC PAIN: Primary | ICD-10-CM

## 2019-08-13 PROCEDURE — 99214 OFFICE O/P EST MOD 30 MIN: CPT | Performed by: INTERNAL MEDICINE

## 2019-08-13 NOTE — PROGRESS NOTES
[facility] [place] [facility]  MARCELO GILLIS M.D.      Gia Graves / 38 y.o. / female  08/13/2019      CC:  Main reason(s) for today's visit: Abdominal Pain (discomfort, intermittent cramping, like menstrual cramps x 2 weeks; constant x 1 day)      HPI:     Left pelvic pain started several days after finishing menstruation last week. Now with 1-2 days of persistent pain. Denies fever, chills, nausea/vomiting, dysuria, hematuria, vaginal discharge or bleeding. Denies back/flank pain. Is not sexually active. Denies possibility of pregnancy. Denies changes in bowels.       Patient Care Team:  Kenyon Gillis MD as PCP - General (Internal Medicine)  Nic Lerma MD as Consulting Physician (Hematology and Oncology)    ASSESSMENT & PLAN:    1. Pelvic pain      Orders Placed This Encounter   Procedures   • Urinalysis With Culture If Indicated -     No orders of the defined types were placed in this encounter.       Summary/Discussion:  Left pelvic pain which may be related to ovarian cyst.   Check urinalysis with culture if indicated.   Instructed her to call gynecologist tomorrow to be seen for pelvic exam and US  If not able to be seen this week, she was told to contact us for CT of pelvis.     She expressed understanding and agreed to follow above instructions.       Next Appointment with me: 11/6/2019    No Follow-up on file.    ____________________________________________________________________    MEDICATIONS  Current Outpatient Medications   Medication Sig Dispense Refill   • Cholecalciferol (VITAMIN D3) 2000 units capsule Take 1 capsule by mouth Daily.     • Multiple Vitamins-Minerals (CENTRUM ADULTS) tablet Take 1 tablet by mouth Daily.       No current facility-administered medications for this visit.               ____________________________________________________________________      REVIEW OF SYSTEMS    Review of Systems  No chest pain   No shortness of breath  GI no change    VITALS    Visit Vitals  /84  "  Pulse 71   Temp 98.2 °F (36.8 °C)   Ht 166.4 cm (65.51\")   Wt 69.4 kg (153 lb)   SpO2 99%   BMI 25.07 kg/m²       BP Readings from Last 3 Encounters:   08/13/19 120/84   05/08/19 110/70   05/03/19 131/87     Wt Readings from Last 3 Encounters:   08/13/19 69.4 kg (153 lb)   05/08/19 68.9 kg (152 lb)   05/03/19 69 kg (152 lb 3.2 oz)      Body mass index is 25.07 kg/m².    PHYSICAL EXAMINATION    Physical Exam   Constitutional: No distress.   Abdominal: Soft. Normal appearance. There is tenderness in the left lower quadrant. There is no rigidity, no rebound and no guarding. No hernia.               REVIEWED DATA:    Labs:   Lab Results   Component Value Date    WBC 8.02 05/03/2019    HGB 11.1 (L) 05/03/2019     05/03/2019        Imaging:        Medical Tests:       Summary of old records / correspondence / consultant report:        Request outside records:       ALLERGIES  Allergies   Allergen Reactions   • No Known Drug Allergy         PFSH:     The following portions of the patient's history were reviewed and updated as appropriate: Allergies / Current Medications / Past Medical History / Surgical History / Social History / Family History    PROBLEM LIST   Patient Active Problem List   Diagnosis   • Mixed hyperlipidemia   • Vitamin D deficiency   • Elevated hemoglobin A1c   • Marked eosinophilia   • Iron deficiency anemia       PAST MEDICAL HISTORY  Past Medical History:   Diagnosis Date   • H/O foreign travel 10/2018    Michaelle, Charles 09/2018   • H/O Iron deficiency     Mild   • H/O Vitamin D deficiency    • Hyperlipidemia    • Thyroid disease        SURGICAL HISTORY  Past Surgical History:   Procedure Laterality Date   • APPENDECTOMY     • WISDOM TOOTH EXTRACTION      one only       SOCIAL HISTORY  Social History     Socioeconomic History   • Marital status: Single     Spouse name: Not on file   • Number of children: 0   • Years of education: College   • Highest education level: Not on file   Occupational " History   • Occupation: [employer]-     • Occupation: () [place] [employer]     [employer]er: [employer] [place]   Tobacco Use   • Smoking status: Never Smoker   • Smokeless tobacco: Never Used   • Tobacco comment: former social smoker when younger   Substance and Sexual Activity   • Alcohol use: Yes     Comment: occasionally during weekends   • Drug use: No   • Sexual activity: No     Birth control/protection: None   Lifestyle   • Physical activity:     Days per week: 0 days     Minutes per session: Not on file   • Stress: Very much       FAMILY HISTORY  Family History   Problem Relation Age of Onset   • Hyperlipidemia Mother    • Diabetes Mother    • Hypertension Mother    • Hypertension Father    • Hyperlipidemia Father    • Diabetes Father    • Lung cancer Maternal Grandmother         smoker    • Aortic aneurysm Paternal Grandfather    • No Known Problems Sister    • No Known Problems Brother    • Colon cancer Neg Hx    • Breast cancer Neg Hx          **Dragon Disclaimer:   Much of this encounter note is an electronic transcription/translation of spoken language to printed text. The electronic translation of spoken language may permit erroneous, or at times, nonsensical words or phrases to be inadvertently transcribed. Although I have reviewed the note for such errors, some may still exist.       Template created by Brook Orellana MD

## 2019-08-15 DIAGNOSIS — R31.9 URINARY TRACT INFECTION WITH HEMATURIA, SITE UNSPECIFIED: Primary | ICD-10-CM

## 2019-08-15 DIAGNOSIS — N39.0 URINARY TRACT INFECTION WITH HEMATURIA, SITE UNSPECIFIED: Primary | ICD-10-CM

## 2019-08-15 LAB
APPEARANCE UR: CLEAR
BACTERIA #/AREA URNS HPF: NORMAL /HPF
BILIRUB UR QL STRIP: NEGATIVE
COLOR UR: YELLOW
EPI CELLS #/AREA URNS HPF: NORMAL /HPF
GLUCOSE UR QL: NEGATIVE
HGB UR QL STRIP: ABNORMAL
KETONES UR QL STRIP: NEGATIVE
LEUKOCYTE ESTERASE UR QL STRIP: NEGATIVE
MICRO URNS: ABNORMAL
MUCOUS THREADS URNS QL MICRO: PRESENT /HPF
NITRITE UR QL STRIP: NEGATIVE
PH UR STRIP: 7 [PH] (ref 5–7.5)
PROT UR QL STRIP: NEGATIVE
RBC #/AREA URNS HPF: NORMAL /HPF
SP GR UR: 1.02 (ref 1–1.03)
URINALYSIS REFLEX: ABNORMAL
UROBILINOGEN UR STRIP-MCNC: 0.2 MG/DL (ref 0.2–1)
WBC #/AREA URNS HPF: NORMAL /HPF

## 2019-08-15 RX ORDER — SULFAMETHOXAZOLE AND TRIMETHOPRIM 800; 160 MG/1; MG/1
1 TABLET ORAL 2 TIMES DAILY
Qty: 6 TABLET | Refills: 0 | Status: SHIPPED | OUTPATIENT
Start: 2019-08-15 | End: 2019-08-18

## 2019-11-06 ENCOUNTER — OFFICE VISIT (OUTPATIENT)
Dept: INTERNAL MEDICINE | Age: 39
End: 2019-11-06

## 2019-11-06 VITALS
DIASTOLIC BLOOD PRESSURE: 80 MMHG | SYSTOLIC BLOOD PRESSURE: 138 MMHG | HEART RATE: 74 BPM | OXYGEN SATURATION: 99 % | TEMPERATURE: 97.5 F | BODY MASS INDEX: 25.23 KG/M2 | WEIGHT: 157 LBS | HEIGHT: 66 IN

## 2019-11-06 DIAGNOSIS — E55.9 VITAMIN D DEFICIENCY: Chronic | ICD-10-CM

## 2019-11-06 DIAGNOSIS — D72.10 MARKED EOSINOPHILIA: Primary | ICD-10-CM

## 2019-11-06 DIAGNOSIS — E78.2 MIXED HYPERLIPIDEMIA: Chronic | ICD-10-CM

## 2019-11-06 DIAGNOSIS — R73.09 ELEVATED HEMOGLOBIN A1C: ICD-10-CM

## 2019-11-06 LAB
25(OH)D3+25(OH)D2 SERPL-MCNC: 22.7 NG/ML (ref 30–100)
BASOPHILS # BLD AUTO: 0.1 10*3/MM3 (ref 0–0.2)
BASOPHILS NFR BLD AUTO: 1.3 % (ref 0–1.5)
EOSINOPHIL # BLD AUTO: 0.49 10*3/MM3 (ref 0–0.4)
EOSINOPHIL NFR BLD AUTO: 6.5 % (ref 0.3–6.2)
ERYTHROCYTE [DISTWIDTH] IN BLOOD BY AUTOMATED COUNT: 14.1 % (ref 12.3–15.4)
HBA1C MFR BLD: 5.8 % (ref 4.8–5.6)
HCT VFR BLD AUTO: 36.6 % (ref 34–46.6)
HGB BLD-MCNC: 11.8 G/DL (ref 12–15.9)
IMM GRANULOCYTES # BLD AUTO: 0.02 10*3/MM3 (ref 0–0.05)
IMM GRANULOCYTES NFR BLD AUTO: 0.3 % (ref 0–0.5)
LYMPHOCYTES # BLD AUTO: 1.93 10*3/MM3 (ref 0.7–3.1)
LYMPHOCYTES NFR BLD AUTO: 25.6 % (ref 19.6–45.3)
MCH RBC QN AUTO: 26.1 PG (ref 26.6–33)
MCHC RBC AUTO-ENTMCNC: 32.2 G/DL (ref 31.5–35.7)
MCV RBC AUTO: 81 FL (ref 79–97)
MONOCYTES # BLD AUTO: 0.5 10*3/MM3 (ref 0.1–0.9)
MONOCYTES NFR BLD AUTO: 6.6 % (ref 5–12)
NEUTROPHILS # BLD AUTO: 4.49 10*3/MM3 (ref 1.7–7)
NEUTROPHILS NFR BLD AUTO: 59.7 % (ref 42.7–76)
NRBC BLD AUTO-RTO: 0 /100 WBC (ref 0–0.2)
PLATELET # BLD AUTO: 382 10*3/MM3 (ref 140–450)
RBC # BLD AUTO: 4.52 10*6/MM3 (ref 3.77–5.28)
WBC # BLD AUTO: 7.53 10*3/MM3 (ref 3.4–10.8)

## 2019-11-06 PROCEDURE — 99214 OFFICE O/P EST MOD 30 MIN: CPT | Performed by: INTERNAL MEDICINE

## 2019-11-06 NOTE — PROGRESS NOTES
[facility] [place] [facility]  MARCELO GILLIS M.D.      Gia Graves / 38 y.o. / female  11/06/2019      CHIEF COMPLAINT     eosinophilia (6 month f/u)      ASSESSMENT & PLAN     Problem List Items Addressed This Visit        Medium    Mixed hyperlipidemia (Chronic)    Overview     Maintain low fat/cholesterol diet.           Current Assessment & Plan     Lab Results   Component Value Date     (H) 04/22/2019     (H) 01/02/2018     (H) 05/06/2015    HDL 46 04/22/2019    HDL 61 (H) 01/02/2018    HDL 49 05/06/2015    TRIG 62 04/22/2019    CHOLHDLRATIO 3.48 04/22/2019    CHOLHDLRATIO 3.39 01/02/2018     Outside CT abdomen reviewed which showed early atherosclerosis of the abdominal aorta. Discussed risk factor modifications.          Marked eosinophilia - Primary    Current Assessment & Plan     Recheck complete blood count with diff.          Relevant Orders    CBC & Differential       Low    Vitamin D deficiency (Chronic)    Current Assessment & Plan     Check vitamin D level.          Relevant Medications    Cholecalciferol (VITAMIN D3) 2000 units capsule    Other Relevant Orders    Vitamin D 25 Hydroxy    Elevated hemoglobin A1c    Overview     Maintain a low sugar/starch/carbohydrate diet and exercise regularly. Weight loss advised.           Current Assessment & Plan     Lab Results   Component Value Date    HGBA1C 5.50 04/22/2019    HGBA1C 5.69 (H) 01/02/2018    HGBA1C 5.8 (H) 05/06/2015      Maintain a low sugar/starch/carbohydrate diet and exercise regularly. Wt loss advised.  Advised 10-15 lbs weight loss.          Relevant Orders    Hemoglobin A1c        Orders Placed This Encounter   Procedures   • Hemoglobin A1c   • Vitamin D 25 Hydroxy   • CBC & Differential     No orders of the defined types were placed in this encounter.      Summary/Discussion:  ·       Next Appointment with me: Visit date not found    Return in about 7 months (around 6/1/2020) for SCHEDULE ANNUAL PHYSICAL FOR NEXT  "YEAR.      MEDICATIONS     Current Outpatient Medications   Medication Sig Dispense Refill   • Cholecalciferol (VITAMIN D3) 2000 units capsule Take 1 capsule by mouth Daily.     • Multiple Vitamins-Minerals (CENTRUM ADULTS) tablet Take 1 tablet by mouth Daily.       No current facility-administered medications for this visit.           VITAL SIGNS     Visit Vitals  /80   Pulse 74   Temp 97.5 °F (36.4 °C)   Ht 166.4 cm (65.51\")   Wt 71.2 kg (157 lb)   LMP 10/19/2019   SpO2 99%   BMI 25.72 kg/m²       BP Readings from Last 3 Encounters:   11/06/19 138/80   08/13/19 120/84   05/08/19 110/70     Wt Readings from Last 3 Encounters:   11/06/19 71.2 kg (157 lb)   08/13/19 69.4 kg (153 lb)   05/08/19 68.9 kg (152 lb)      Body mass index is 25.72 kg/m².      HISTORY OF PRESENT ILLNESS     Marked eosinophilia: last complete blood count showed normalization. Clinically feels fine.   No more unusual myalgia symptoms.     CT of abdominal / pelvis reviewed from [facility] showing early atherosclerosis of the abdominal aorta.   She has hyperlipidemia with family history of hypertension, hyperlipidemia and diabetes.   She has personal history of elevated A1c and high LDL cholesterol with marginal HDL.   She has not been exercising and weight is trending upwards.   Vitamin D deficiency: last level was around 22. Takes vitamin D3 2000 IU daily.       Patient Care Team:  Kenyon Orellana MD as PCP - General (Internal Medicine)  Nic Lerma MD as Consulting Physician (Hematology and Oncology)      REVIEW OF SYSTEMS     Review of Systems  Constitutional neg  Resp neg  CV neg  GI neg  Skin neg rash  MuSk neg myalgia       PHYSICAL EXAMINATION     Physical Exam  Constitutional  No distress  Cardiovascular Rate  normal . Rhythm: regular . Heart sounds:  Normal  Psychiatric  Alert. Judgment intact. Thought content normal. Mood normal      REVIEWED DATA     Labs:     Lab Results   Component Value Date     04/22/2019    K 4.5 " 04/22/2019    CALCIUM 9.8 04/22/2019    AST 11 04/22/2019    ALT 11 04/22/2019    BUN 10 04/22/2019    CREATININE 0.74 04/22/2019    CREATININE 0.82 03/27/2019    CREATININE CANCELED 03/14/2019    EGFRIFNONA 88 04/22/2019    EGFRIFAFRI 107 04/22/2019       Lab Results   Component Value Date    HGBA1C 5.50 04/22/2019    HGBA1C 5.69 (H) 01/02/2018    HGBA1C 5.8 (H) 05/06/2015     (H) 04/22/2019    GLU 98 03/27/2019    GLU CANCELED 03/14/2019       Lab Results   Component Value Date     (H) 04/22/2019     (H) 01/02/2018     (H) 05/06/2015    HDL 46 04/22/2019    HDL 61 (H) 01/02/2018    HDL 49 05/06/2015    TRIG 62 04/22/2019    TRIG 99 01/02/2018    TRIG 102 05/06/2015    CHOLHDLRATIO 3.48 04/22/2019    CHOLHDLRATIO 3.39 01/02/2018       Lab Results   Component Value Date    TSH 1.360 04/22/2019    FREET4 1.25 04/22/2019       Lab Results   Component Value Date    WBC 8.02 05/03/2019    HGB 11.1 (L) 05/03/2019    HGB 11.4 (L) 04/22/2019    HGB 12.8 04/05/2019     05/03/2019       Lab Results   Component Value Date    PROTEIN Negative 08/14/2019    GLUCOSEU Negative 08/14/2019    BLOODU 1+ (A) 08/14/2019    NITRITEU Negative 08/14/2019    LEUKOCYTESUR Negative 08/14/2019       Imaging:   CT abdomen and pelvis with intravenous contrast.    DATE: 10/04/2019        HISTORY: Right lower quadrant abdominal mass. No history of malignancy.      TECHNIQUE: Multislice helical abdomen and pelvis protocol with intravenous contrast. There are no prior studies.     Dose reduction techniques were employed per ALARA protocol.      FINDINGS: There is a punctate cyst at the dome of hepatic segment 8. Liver otherwise unremarkable with no dilation of biliary ducts. Gallbladder normal. Spleen, pancreas, and adrenal glands unremarkable. Kidneys and ureters normal as is the bladder.   Uterus and ovaries within normal limits. Bowel loops unremarkable, including terminal ileum. Appendix is absent. No  peritoneal mass.    Trace pelvic ascites. No adenopathy. Aorta normal caliber with early atherosclerotic calcification. Incidental 3 mm noncalcified right lower lobe nodule on image 8. Lung bases otherwise clear. No acute bone abnormalities. The abdominal wall is   unremarkable.    IMPRESSION:  1. No mass or other localizing right lower quadrant abnormality. No signs of acute inflammation. Appendix is absent.  2. No significant abdominal or pelvic abnormality.  3. Incidental 3 mm right lower lobe lung nodule. In the absence of significant smoking history or known primary malignancy, an incidental nodule of this size can be ignored. In the presence of such risk factors, follow-up CT chest would be recommended in   one year.  4. Early atherosclerotic aortic calcification, notable given the patient's relatively young age.      Medical Tests:         Summary of old records / correspondence / consultant report:         Request outside records:         ALLERGIES  Allergies   Allergen Reactions   • No Known Drug Allergy         PFSH:     The following portions of the patient's history were reviewed and updated as appropriate: Allergies / Current Medications / Past Medical History / Surgical History / Social History / Family History    PROBLEM LIST   Patient Active Problem List   Diagnosis   • Mixed hyperlipidemia   • Vitamin D deficiency   • Elevated hemoglobin A1c   • Marked eosinophilia   • Iron deficiency anemia       PAST MEDICAL HISTORY  Past Medical History:   Diagnosis Date   • H/O foreign travel 10/2018    Michaelle, Charles 09/2018   • H/O Iron deficiency     Mild   • H/O Vitamin D deficiency    • Hyperlipidemia    • Thyroid disease        SURGICAL HISTORY  Past Surgical History:   Procedure Laterality Date   • APPENDECTOMY     • WISDOM TOOTH EXTRACTION      one only       SOCIAL HISTORY  Social History     Socioeconomic History   • Marital status: Single     Spouse name: Not on file   • Number of children: 0   • Years  of education: College   • Highest education level: Not on file   Occupational History   • Occupation: () [employer]     [employer]er: [employer] [employer]   Tobacco Use   • Smoking status: Never Smoker   • Smokeless tobacco: Never Used   • Tobacco comment: former social smoker when younger   Substance and Sexual Activity   • Alcohol use: Yes     Comment: occasionally during weekends   • Drug use: No   • Sexual activity: No     Birth control/protection: None   Lifestyle   • Physical activity:     Days per week: 0 days     Minutes per session: Not on file   • Stress: Very much       FAMILY HISTORY  Family History   Problem Relation Age of Onset   • Hyperlipidemia Mother    • Diabetes Mother    • Hypertension Mother    • Hypertension Father    • Hyperlipidemia Father    • Diabetes Father    • Lung cancer Maternal Grandmother         smoker    • Aortic aneurysm Paternal Grandfather    • No Known Problems Sister    • No Known Problems Brother    • Colon cancer Neg Hx    • Breast cancer Neg Hx          **Dragon Disclaimer:   Much of this encounter note is an electronic transcription/translation of spoken language to printed text. The electronic translation of spoken language may permit erroneous, or at times, nonsensical words or phrases to be inadvertently transcribed. Although I have reviewed the note for such errors, some may still exist.       Template created by Brook Orellana MD

## 2019-11-06 NOTE — ASSESSMENT & PLAN NOTE
Lab Results   Component Value Date     (H) 04/22/2019     (H) 01/02/2018     (H) 05/06/2015    HDL 46 04/22/2019    HDL 61 (H) 01/02/2018    HDL 49 05/06/2015    TRIG 62 04/22/2019    CHOLHDLRATIO 3.48 04/22/2019    CHOLHDLRATIO 3.39 01/02/2018     Outside CT abdomen reviewed which showed early atherosclerosis of the abdominal aorta. Discussed risk factor modifications.

## 2019-11-06 NOTE — ASSESSMENT & PLAN NOTE
Lab Results   Component Value Date    HGBA1C 5.50 04/22/2019    HGBA1C 5.69 (H) 01/02/2018    HGBA1C 5.8 (H) 05/06/2015      Maintain a low sugar/starch/carbohydrate diet and exercise regularly. Wt loss advised.  Advised 10-15 lbs weight loss.

## 2019-11-06 NOTE — PATIENT INSTRUCTIONS
** IMPORTANT MESSAGE FROM DR. GILLIS **    In our office, your satisfaction is VERY important to us.     You may receive a survey from Suzie Rabago by mail or E-mail for you to provide feedback about your visit. This information is invaluable for me to know what we can do to improve our services.     I ask that you please take a few minutes to complete the survey and let us know how we are doing in serving your needs. (You may receive the survey more than once for multiple visits)    Thank You !    Dr. Gillis & Staff    __________________________________________________________      ADDITIONAL INSTRUCTION / REMINDERS FROM DR. GILLIS

## 2019-11-07 DIAGNOSIS — D72.10 MARKED EOSINOPHILIA: Primary | ICD-10-CM

## 2019-11-07 NOTE — PROGRESS NOTES
Christina:    Gia, here are the result(s) of your test(s):     Eosinophil % and absolute eosinophil count is marginally elevated.   Recheck complete blood count with differential in 3 months. I will place a future lab order in our system. Call my office to schedule lab only appointment for 3 months.     Please do not hesitate to contact me if you have questions.

## 2019-11-13 ENCOUNTER — TELEPHONE (OUTPATIENT)
Dept: INTERNAL MEDICINE | Age: 39
End: 2019-11-13

## 2019-11-13 NOTE — TELEPHONE ENCOUNTER
Pt is applying for a scholarship.    Pt needs a ltr stating that she has not suffered from any infectious diseases.    The ltr needs to be on  letterhead.    Pls advise.    Pt can be reached #[phone].

## 2020-02-12 ENCOUNTER — RESULTS ENCOUNTER (OUTPATIENT)
Dept: INTERNAL MEDICINE | Age: 40
End: 2020-02-12

## 2020-02-12 DIAGNOSIS — D72.10 MARKED EOSINOPHILIA: ICD-10-CM

## 2020-02-12 LAB
BASOPHILS # BLD AUTO: 0.07 10*3/MM3 (ref 0–0.2)
BASOPHILS NFR BLD AUTO: 0.8 % (ref 0–1.5)
EOSINOPHIL # BLD AUTO: 0.35 10*3/MM3 (ref 0–0.4)
EOSINOPHIL NFR BLD AUTO: 4.2 % (ref 0.3–6.2)
ERYTHROCYTE [DISTWIDTH] IN BLOOD BY AUTOMATED COUNT: 13.6 % (ref 12.3–15.4)
HCT VFR BLD AUTO: 36 % (ref 34–46.6)
HGB BLD-MCNC: 11.6 G/DL (ref 12–15.9)
IMM GRANULOCYTES # BLD AUTO: 0.03 10*3/MM3 (ref 0–0.05)
IMM GRANULOCYTES NFR BLD AUTO: 0.4 % (ref 0–0.5)
LYMPHOCYTES # BLD AUTO: 2.01 10*3/MM3 (ref 0.7–3.1)
LYMPHOCYTES NFR BLD AUTO: 23.8 % (ref 19.6–45.3)
MCH RBC QN AUTO: 26.1 PG (ref 26.6–33)
MCHC RBC AUTO-ENTMCNC: 32.2 G/DL (ref 31.5–35.7)
MCV RBC AUTO: 81.1 FL (ref 79–97)
MONOCYTES # BLD AUTO: 0.42 10*3/MM3 (ref 0.1–0.9)
MONOCYTES NFR BLD AUTO: 5 % (ref 5–12)
NEUTROPHILS # BLD AUTO: 5.55 10*3/MM3 (ref 1.7–7)
NEUTROPHILS NFR BLD AUTO: 65.8 % (ref 42.7–76)
NRBC BLD AUTO-RTO: 0 /100 WBC (ref 0–0.2)
PLATELET # BLD AUTO: 359 10*3/MM3 (ref 140–450)
RBC # BLD AUTO: 4.44 10*6/MM3 (ref 3.77–5.28)
WBC # BLD AUTO: 8.43 10*3/MM3 (ref 3.4–10.8)

## 2020-02-13 NOTE — PROGRESS NOTES
Leight:    Gia, here are the result(s) of your test(s):     Mild anemia. Eosinophils are normal.     Please do not hesitate to contact me if you have questions.

## 2020-11-12 DIAGNOSIS — Z00.00 PREVENTATIVE HEALTH CARE: Primary | ICD-10-CM

## 2020-11-19 ENCOUNTER — RESULTS ENCOUNTER (OUTPATIENT)
Dept: INTERNAL MEDICINE | Age: 40
End: 2020-11-19

## 2020-11-19 DIAGNOSIS — Z00.00 PREVENTATIVE HEALTH CARE: ICD-10-CM

## 2020-11-24 LAB
ALBUMIN SERPL-MCNC: 4.4 G/DL (ref 3.5–5.2)
ALBUMIN/GLOB SERPL: 1.8 G/DL
ALP SERPL-CCNC: 61 U/L (ref 39–117)
ALT SERPL-CCNC: 13 U/L (ref 1–33)
APPEARANCE UR: CLEAR
AST SERPL-CCNC: 13 U/L (ref 1–32)
BACTERIA #/AREA URNS HPF: NORMAL /HPF
BASOPHILS # BLD AUTO: 0.08 10*3/MM3 (ref 0–0.2)
BASOPHILS NFR BLD AUTO: 1.3 % (ref 0–1.5)
BILIRUB SERPL-MCNC: 0.3 MG/DL (ref 0–1.2)
BILIRUB UR QL STRIP: NEGATIVE
BUN SERPL-MCNC: 12 MG/DL (ref 6–20)
BUN/CREAT SERPL: 18.5 (ref 7–25)
CALCIUM SERPL-MCNC: 9.6 MG/DL (ref 8.6–10.5)
CASTS URNS MICRO: NORMAL
CHLORIDE SERPL-SCNC: 101 MMOL/L (ref 98–107)
CHOLEST SERPL-MCNC: 197 MG/DL (ref 0–200)
CHOLEST/HDLC SERPL: 3.58 {RATIO}
CO2 SERPL-SCNC: 27.9 MMOL/L (ref 22–29)
COLOR UR: YELLOW
CREAT SERPL-MCNC: 0.65 MG/DL (ref 0.57–1)
EOSINOPHIL # BLD AUTO: 0.42 10*3/MM3 (ref 0–0.4)
EOSINOPHIL NFR BLD AUTO: 6.6 % (ref 0.3–6.2)
EPI CELLS #/AREA URNS HPF: NORMAL /HPF
ERYTHROCYTE [DISTWIDTH] IN BLOOD BY AUTOMATED COUNT: 13.9 % (ref 12.3–15.4)
GLOBULIN SER CALC-MCNC: 2.5 GM/DL
GLUCOSE SERPL-MCNC: 106 MG/DL (ref 65–99)
GLUCOSE UR QL: NEGATIVE
HBA1C MFR BLD: 5.5 % (ref 4.8–5.6)
HCT VFR BLD AUTO: 37.4 % (ref 34–46.6)
HCV AB S/CO SERPL IA: <0.1 S/CO RATIO (ref 0–0.9)
HDLC SERPL-MCNC: 55 MG/DL (ref 40–60)
HGB BLD-MCNC: 12.3 G/DL (ref 12–15.9)
HGB UR QL STRIP: ABNORMAL
IMM GRANULOCYTES # BLD AUTO: 0.02 10*3/MM3 (ref 0–0.05)
IMM GRANULOCYTES NFR BLD AUTO: 0.3 % (ref 0–0.5)
KETONES UR QL STRIP: NEGATIVE
LDLC SERPL CALC-MCNC: 128 MG/DL (ref 0–100)
LEUKOCYTE ESTERASE UR QL STRIP: NEGATIVE
LYMPHOCYTES # BLD AUTO: 2.02 10*3/MM3 (ref 0.7–3.1)
LYMPHOCYTES NFR BLD AUTO: 32 % (ref 19.6–45.3)
MCH RBC QN AUTO: 26.2 PG (ref 26.6–33)
MCHC RBC AUTO-ENTMCNC: 32.9 G/DL (ref 31.5–35.7)
MCV RBC AUTO: 79.6 FL (ref 79–97)
MONOCYTES # BLD AUTO: 0.37 10*3/MM3 (ref 0.1–0.9)
MONOCYTES NFR BLD AUTO: 5.9 % (ref 5–12)
NEUTROPHILS # BLD AUTO: 3.41 10*3/MM3 (ref 1.7–7)
NEUTROPHILS NFR BLD AUTO: 53.9 % (ref 42.7–76)
NITRITE UR QL STRIP: NEGATIVE
NRBC BLD AUTO-RTO: 0 /100 WBC (ref 0–0.2)
PH UR STRIP: 6 [PH] (ref 5–8)
PLATELET # BLD AUTO: 399 10*3/MM3 (ref 140–450)
POTASSIUM SERPL-SCNC: 4.7 MMOL/L (ref 3.5–5.2)
PROT SERPL-MCNC: 6.9 G/DL (ref 6–8.5)
PROT UR QL STRIP: NEGATIVE
RBC # BLD AUTO: 4.7 10*6/MM3 (ref 3.77–5.28)
RBC #/AREA URNS HPF: NORMAL /HPF
SODIUM SERPL-SCNC: 136 MMOL/L (ref 136–145)
SP GR UR: 1.02 (ref 1–1.03)
T4 FREE SERPL-MCNC: 1.37 NG/DL (ref 0.93–1.7)
TRIGL SERPL-MCNC: 79 MG/DL (ref 0–150)
TSH SERPL DL<=0.005 MIU/L-ACNC: 1.04 UIU/ML (ref 0.27–4.2)
UROBILINOGEN UR STRIP-MCNC: ABNORMAL MG/DL
VLDLC SERPL CALC-MCNC: 14 MG/DL (ref 5–40)
WBC # BLD AUTO: 6.32 10*3/MM3 (ref 3.4–10.8)
WBC #/AREA URNS HPF: NORMAL /HPF

## 2020-12-02 ENCOUNTER — OFFICE VISIT (OUTPATIENT)
Dept: INTERNAL MEDICINE | Age: 40
End: 2020-12-02

## 2020-12-02 VITALS
HEART RATE: 77 BPM | SYSTOLIC BLOOD PRESSURE: 138 MMHG | WEIGHT: 156 LBS | TEMPERATURE: 96.9 F | HEIGHT: 66 IN | BODY MASS INDEX: 25.07 KG/M2 | DIASTOLIC BLOOD PRESSURE: 84 MMHG | OXYGEN SATURATION: 99 %

## 2020-12-02 DIAGNOSIS — Z00.00 ENCOUNTER FOR ANNUAL HEALTH EXAMINATION: Primary | ICD-10-CM

## 2020-12-02 PROBLEM — D72.10 EOSINOPHILIA: Chronic | Status: ACTIVE | Noted: 2019-04-02

## 2020-12-02 PROCEDURE — 99395 PREV VISIT EST AGE 18-39: CPT | Performed by: INTERNAL MEDICINE

## 2020-12-02 NOTE — PROGRESS NOTES
"[facility] [place] [facility]  MARCELO GILLIS M.D.      Gia Graves / 39 y.o. / female  12/02/2020      CHIEF COMPLAINT     Annual Exam (5/8/19)      VITALS     Visit Vitals  /84   Pulse 77   Temp 96.9 °F (36.1 °C)   Ht 167.6 cm (66\")   Wt 70.8 kg (156 lb)   LMP 11/15/2020 (Exact Date)   SpO2 99%   BMI 25.18 kg/m²       BP Readings from Last 3 Encounters:   12/02/20 138/84   11/06/19 138/80   08/13/19 120/84     Wt Readings from Last 3 Encounters:   12/02/20 70.8 kg (156 lb)   11/06/19 71.2 kg (157 lb)   08/13/19 69.4 kg (153 lb)      Body mass index is 25.18 kg/m².    MEDICATIONS     Current Outpatient Medications   Medication Sig Dispense Refill   • Cholecalciferol (VITAMIN D3) 2000 units capsule Take 1 capsule by mouth Daily.     • Multiple Vitamins-Minerals (CENTRUM ADULTS) tablet Take 1 tablet by mouth Daily.       No current facility-administered medications for this visit.        _____________________________________________________________________________________    HISTORY OF PRESENT ILLNESS     Gia presents for annual health maintenance visit.    · Last health maintenance visit: approximately 1 year ago  · General health: fair  · Lifestyle:  · Attempting to lose weight?: No   · Diet: eats decently  · Exercise: exercises 2 days/week  · Tobacco: Never used   · Alcohol: occasional/rare  · Work: Full-time  · Reproductive health:  · Sexually active?: No   · Sexual problems?: No problems  · Concern for STD?: No    · Sees Gynecologist?: Yes   · Rosana/Postmenopausal?: No   · Domestic abuse concerns: No   · Depression Screening:      PHQ-2/PHQ-9 Depression Screening 4/1/2019   Little interest or pleasure in doing things 0   Feeling down, depressed, or hopeless 0   Total Score 0         PHQ-2: 0 (Not depressed)   PHQ-9: 0 (Negative screening for depression)    Patient Care Team:  Kenyon Gillis MD as PCP - General (Internal Medicine)  Nic Lerma MD as Consulting Physician (Hematology and " Oncology)  ______________________________________________________________________    ALLERGIES  Allergies   Allergen Reactions   • No Known Drug Allergy         PFSH:     The following portions of the patient's history were reviewed and updated as appropriate: Allergies / Current Medications / Past Medical History / Surgical History / Social History / Family History    PROBLEM LIST   Patient Active Problem List   Diagnosis   • Mixed hyperlipidemia   • Vitamin D deficiency   • Elevated hemoglobin A1c   • Eosinophilia   • Iron deficiency anemia       PAST MEDICAL HISTORY  Past Medical History:   Diagnosis Date   • H/O Iron deficiency     Mild   • H/O Vitamin D deficiency    • Hyperlipidemia    • Thyroid disease        SURGICAL HISTORY  Past Surgical History:   Procedure Laterality Date   • APPENDECTOMY     • WISDOM TOOTH EXTRACTION      one only       SOCIAL HISTORY  Social History     Socioeconomic History   • Marital status: Single     Spouse name: Not on file   • Number of children: 0   • Years of education: College   • Highest education level: Not on file   Occupational History   • Occupation: ([employer]     Employer: [place] [employer]   Tobacco Use   • Smoking status: Never Smoker   • Smokeless tobacco: Never Used   • Tobacco comment: former social smoker when younger   Substance and Sexual Activity   • Alcohol use: Yes     Comment: occasionally during weekends   • Drug use: No   • Sexual activity: Not Currently     Birth control/protection: None   Lifestyle   • Physical activity     Days per week: 2 days     Minutes per session: Not on file   • Stress: Very much       FAMILY HISTORY  Family History   Problem Relation Age of Onset   • Hyperlipidemia Mother    • Diabetes Mother    • Hypertension Mother    • Hypertension Father    • Hyperlipidemia Father    • Diabetes Father    • Lung cancer Maternal Grandmother         smoker    • Aortic aneurysm Paternal Grandfather    • No Known  Problems Sister    • No Known Problems Brother    • Colon cancer Neg Hx    • Breast cancer Neg Hx        IMMUNIZATION HISTORY  Immunization History   Administered Date(s) Administered   • Td 02/25/2010       ______________________________________________________________________    REVIEW OF SYSTEMS     Review of Systems   Constitutional: Negative.    HENT: Negative.    Eyes: Negative.    Respiratory: Negative.    Cardiovascular: Negative.    Gastrointestinal: Negative.    Endocrine: Negative.    Genitourinary: Negative.    Musculoskeletal: Negative.  Negative for myalgias.   Skin: Negative.  Negative for rash.   Allergic/Immunologic: Negative.    Neurological: Negative.    Hematological: Negative.    Psychiatric/Behavioral: Positive for agitation and dysphoric mood. The patient is not nervous/anxious.          PHYSICAL EXAMINATION     Physical Exam  Constitutional:       General: She is not in acute distress.     Appearance: She is well-developed.   HENT:      Head: Normocephalic and atraumatic.      Right Ear: Tympanic membrane, ear canal and external ear normal.      Left Ear: Tympanic membrane, ear canal and external ear normal.   Eyes:      General: No scleral icterus.     Conjunctiva/sclera: Conjunctivae normal.      Pupils: Pupils are equal, round, and reactive to light.   Neck:      Musculoskeletal: Normal range of motion and neck supple.      Thyroid: No thyroid mass or thyromegaly.      Trachea: No tracheal deviation.   Cardiovascular:      Rate and Rhythm: Normal rate and regular rhythm.      Heart sounds: Normal heart sounds.   Pulmonary:      Effort: Pulmonary effort is normal.      Breath sounds: Normal breath sounds.   Abdominal:      General: There is no distension.      Palpations: Abdomen is soft. There is no mass.      Tenderness: There is no abdominal tenderness.      Hernia: No hernia is present.   Genitourinary:     Comments: Deferred to gyne/by patient unless specified otherwise.    Musculoskeletal:         General: No deformity.   Skin:     General: Skin is warm.      Coloration: Skin is not pale.      Findings: No rash.   Neurological:      General: No focal deficit present.      Mental Status: She is alert and oriented to person, place, and time.      Cranial Nerves: No cranial nerve deficit.      Motor: No abnormal muscle tone.      Coordination: Coordination normal.      Deep Tendon Reflexes: Reflexes are normal and symmetric.   Psychiatric:         Attention and Perception: Attention normal.         Behavior: Behavior normal.         Thought Content: Thought content normal.         Cognition and Memory: Cognition normal.         Judgment: Judgment normal.      Comments: Agitation          REVIEWED DATA      Labs:    Lab Results   Component Value Date     11/23/2020    K 4.7 11/23/2020    CALCIUM 9.6 11/23/2020    AST 13 11/23/2020    ALT 13 11/23/2020    BUN 12 11/23/2020    CREATININE 0.65 11/23/2020    CREATININE 0.74 04/22/2019    CREATININE 0.82 03/27/2019    EGFRIFNONA 102 11/23/2020    EGFRIFAFRI 124 11/23/2020       Lab Results   Component Value Date     (H) 11/23/2020    HGBA1C 5.50 11/23/2020    HGBA1C 5.80 (H) 11/06/2019    HGBA1C 5.50 04/22/2019    TSH 1.040 11/23/2020    FREET4 1.37 11/23/2020       Lab Results   Component Value Date     (H) 11/23/2020    HDL 55 11/23/2020    TRIG 79 11/23/2020    CHOLHDLRATIO 3.58 11/23/2020       Lab Results   Component Value Date    FMLM29YA 22.7 (L) 11/06/2019        Lab Results   Component Value Date    WBC 6.32 11/23/2020    HGB 12.3 11/23/2020    MCV 79.6 11/23/2020     11/23/2020       Lab Results   Component Value Date    PROTEIN Negative 11/23/2020    GLUCOSEU Negative 11/23/2020    BLOODU See below: (A) 11/23/2020    NITRITEU Negative 11/23/2020    LEUKOCYTESUR Negative 11/23/2020          Lab Results   Component Value Date    HEPCVIRUSABY <0.1 11/23/2020       Imaging:        Medical  Tests:        ASSESSMENT & PLAN     ANNUAL WELLNESS EXAM / PHYSICAL     Other medical problems addressed today:  Problem List Items Addressed This Visit     None      Visit Diagnoses     Encounter for annual health examination    -  Primary          Summary/Discussion:     •     Next Appointment with me: Visit date not found    Return in about 1 year (around 12/2/2021) for ANNUAL PHYSICAL.      HEALTHCARE MAINTENANCE ISSUES     Cancer Screening:  · Colon: Initial/Next screening at age: 46  · Repeat colon cancer screening: N/A at this time  · Breast: Recommended monthly self exams; annual professional exam  · Mammogram: N/A at this time  · Cervical: 1 year or 2 years  · Skin: Monthly self skin examination, annual exam by health professional  · Lung: Does not meet criteria for lung cancer screening.   · Other:    Screening Labs & Tests:  · Lab results reviewed & discussed with the patient or test orders placed today.  · EKG:  · CV Screening: Lipid panel  · DEXA (65+ or postmenopausal with risk factors):   · HEP C (If born 0832[birth year]1417, or risk factors): Negative screen  · Other:     Immunization/Vaccinations (to be given today unless deferred by patient)  · Influenza: Patient deferred/declined flu vaccine (recommended annual vaccination)  · Hepatitis A: Declined by patient  · Hepatitis B: Not needed at this time  · Tetanus/Pertussis: Declined by patient  · Pneumovax: Not needed at this time  · Shingles: Not needed at this time  · Other:     Lifestyle Counseling:  · Lifestyle Modifications: Attempt to lose weight, Improve dietary compliance, Increase intensity/regularity of aerobic exercise, Maintain a low sugar/carbohydrate diet, Follow a low fat, low cholesterol diet, Reduce exposure to stress if possible, Discussed better management of stress/anxiety and Discussed sexual issues, safe sex practices, contraception  · Safety Issues: Always wear seatbelt, Avoid texting while driving   · Use sunscreen, regular skin  examination  · Recommended annual dental/vision examination.  · Emotional/Stress/Sleep: Reviewed and  given when appropriate      Health Maintenance   Topic Date Due   • TDAP/TD VACCINES (1 - Tdap) 12/02/2020 (Originally 12/11/2000)   • INFLUENZA VACCINE  12/02/2021 (Originally 8/1/2020)   • LIPID PANEL  11/23/2021   • ANNUAL PHYSICAL  12/03/2021   • PAP SMEAR  03/05/2022   • HEPATITIS C SCREENING  Completed   • Pneumococcal Vaccine 0-64  Aged Out         Examiner was wearing KN95 mask, face shield and exam gloves during the entire duration of the visit. Patient was masked the entire time.   Minimum social distance of 6 ft maintained entire visit except if physical contact was necessary as documented.     **Dragon Disclaimer:   Much of this encounter note is an electronic transcription/translation of spoken language to printed text. The electronic translation of spoken language may permit erroneous, or at times, nonsensical words or phrases to be inadvertently transcribed. Although I have reviewed the note for such errors, some may still exist.    Template created by Brook Orellana MD

## 2021-11-10 DIAGNOSIS — Z00.00 PREVENTATIVE HEALTH CARE: Primary | ICD-10-CM

## 2021-12-14 ENCOUNTER — OFFICE VISIT (OUTPATIENT)
Dept: INTERNAL MEDICINE | Age: 41
End: 2021-12-14

## 2021-12-14 VITALS
WEIGHT: 160 LBS | OXYGEN SATURATION: 99 % | DIASTOLIC BLOOD PRESSURE: 82 MMHG | HEIGHT: 66 IN | TEMPERATURE: 97.1 F | HEART RATE: 81 BPM | SYSTOLIC BLOOD PRESSURE: 142 MMHG | BODY MASS INDEX: 25.71 KG/M2

## 2021-12-14 DIAGNOSIS — Z00.00 ENCOUNTER FOR ANNUAL HEALTH EXAMINATION: Primary | ICD-10-CM

## 2021-12-14 DIAGNOSIS — E78.2 MIXED HYPERLIPIDEMIA: Chronic | ICD-10-CM

## 2021-12-14 DIAGNOSIS — I10 PRIMARY HYPERTENSION: Chronic | ICD-10-CM

## 2021-12-14 DIAGNOSIS — E55.9 VITAMIN D DEFICIENCY: Chronic | ICD-10-CM

## 2021-12-14 DIAGNOSIS — R73.09 ELEVATED HEMOGLOBIN A1C: Chronic | ICD-10-CM

## 2021-12-14 PROBLEM — D50.9 IRON DEFICIENCY ANEMIA: Status: RESOLVED | Noted: 2019-05-03 | Resolved: 2021-12-14

## 2021-12-14 PROCEDURE — 99396 PREV VISIT EST AGE 40-64: CPT | Performed by: INTERNAL MEDICINE

## 2021-12-14 RX ORDER — CHLORAL HYDRATE 500 MG
CAPSULE ORAL
COMMUNITY

## 2021-12-14 NOTE — ASSESSMENT & PLAN NOTE
Increase vitamin D 2000 to 2 tablets daily     Lab Results   Component Value Date    HUPZ52QP 22.7 (L) 11/06/2019    FXKU27AG 22.9 (L) 03/27/2019    AVWE73UR 9.6 (L) 01/02/2018

## 2021-12-14 NOTE — PATIENT INSTRUCTIONS
** IMPORTANT MESSAGE FROM DR. GILLIS **    In our office, your satisfaction is VERY important to us.     You may receive a survey from [facility] by mail or E-mail for you to provide feedback about your visit. This information is invaluable for me to know what we can do to improve our services.     I ask that you please take a few minutes to complete the survey and let us know how we are doing in serving your needs. (You may receive the survey more than once for multiple visits)    Thank You !    Dr. Gillis & Staff    _________________________________________________________________________________________________________________________      ** ADDITIONAL INSTRUCTION / REMINDERS FROM DR. GILLIS **      Managing Your Hypertension  Hypertension, also called high blood pressure, is when the force of the blood pressing against the walls of the arteries is too strong. Arteries are blood vessels that carry blood from your heart throughout your body. Hypertension forces the heart to work harder to pump blood and may cause the arteries to become narrow or stiff.  Understanding blood pressure readings  Your personal target blood pressure may vary depending on your medical conditions, your age, and other factors. A blood pressure reading includes a higher number over a lower number. Ideally, your blood pressure should be below 120/80. You should know that:  · The first, or top, number is called the systolic pressure. It is a measure of the pressure in your arteries as your heart beats.  · The second, or bottom number, is called the diastolic pressure. It is a measure of the pressure in your arteries as the heart relaxes.  Blood pressure is classified into four stages. Based on your blood pressure reading, your health care provider may use the following stages to determine what type of treatment you need, if any. Systolic pressure and diastolic pressure are measured in a unit called mmHg.  Normal  · Systolic pressure: below  120.  · Diastolic pressure: below 80.  Elevated  · Systolic pressure: 120-129.  · Diastolic pressure: below 80.  Hypertension stage 1  · Systolic pressure: 130-139.  · Diastolic pressure: 80-89.  Hypertension stage 2  · Systolic pressure: 140 or above.  · Diastolic pressure: 90 or above.  How can this condition affect me?  Managing your hypertension is an important responsibility. Over time, hypertension can damage the arteries and decrease blood flow to important parts of the body, including the brain, heart, and kidneys. Having untreated or uncontrolled hypertension can lead to:  · A heart attack.  · A stroke.  · A weakened blood vessel (aneurysm).  · Heart failure.  · Kidney damage.  · Eye damage.  · Metabolic syndrome.  · Memory and concentration problems.  · Vascular dementia.  What actions can I take to manage this condition?  Hypertension can be managed by making lifestyle changes and possibly by taking medicines. Your health care provider will help you make a plan to bring your blood pressure within a normal range.  Nutrition    · Eat a diet that is high in fiber and potassium, and low in salt (sodium), added sugar, and fat. An example eating plan is called the Dietary Approaches to Stop Hypertension (DASH) diet. To eat this way:  ? Eat plenty of fresh fruits and vegetables. Try to fill one-half of your plate at each meal with fruits and vegetables.  ? Eat whole grains, such as whole-wheat pasta, brown rice, or whole-grain bread. Fill about one-fourth of your plate with whole grains.  ? Eat low-fat dairy products.  ? Avoid fatty cuts of meat, processed or cured meats, and poultry with skin. Fill about one-fourth of your plate with lean proteins such as fish, chicken without skin, beans, eggs, and tofu.  ? Avoid pre-made and processed foods. These tend to be higher in sodium, added sugar, and fat.  · Reduce your daily sodium intake. Most people with hypertension should eat less than 1,500 mg of sodium a  day.    Lifestyle    · Work with your health care provider to maintain a healthy body weight or to lose weight. Ask what an ideal weight is for you.  · Get at least 30 minutes of exercise that causes your heart to beat faster (aerobic exercise) most days of the week. Activities may include walking, swimming, or biking.  · Include exercise to strengthen your muscles (resistance exercise), such as weight lifting, as part of your weekly exercise routine. Try to do these types of exercises for 30 minutes at least 3 days a week.  · Do not use any products that contain nicotine or tobacco, such as cigarettes, e-cigarettes, and chewing tobacco. If you need help quitting, ask your health care provider.  · Control any long-term (chronic) conditions you have, such as high cholesterol or diabetes.  · Identify your sources of stress and find ways to manage stress. This may include meditation, deep breathing, or making time for fun activities.    Alcohol use  · Do not drink alcohol if:  ? Your health care provider tells you not to drink.  ? You are pregnant, may be pregnant, or are planning to become pregnant.  · If you drink alcohol:  ? Limit how much you use to:  § 0-1 drink a day for women.  § 0-2 drinks a day for men.  ? Be aware of how much alcohol is in your drink. In the [place] one drink equals one 12 oz bottle of beer (355 mL), one 5 oz glass of wine (148 mL), or one 1½ oz glass of hard liquor (44 mL).  Medicines  Your health care provider may prescribe medicine if lifestyle changes are not enough to get your blood pressure under control and if:  · Your systolic blood pressure is 130 or higher.  · Your diastolic blood pressure is 80 or higher.  Take medicines only as told by your health care provider. Follow the directions carefully. Blood pressure medicines must be taken as told by your health care provider. The medicine does not work as well when you skip doses. Skipping doses also puts you at risk for  problems.  Monitoring  Before you monitor your blood pressure:  · Do not smoke, drink caffeinated beverages, or exercise within 30 minutes before taking a measurement.  · Use the bathroom and empty your bladder (urinate).  · Sit quietly for at least 5 minutes before taking measurements.  Monitor your blood pressure at home as told by your health care provider. To do this:  · Sit with your back straight and supported.  · Place your feet flat on the floor. Do not cross your legs.  · Support your arm on a flat surface, such as a table. Make sure your upper arm is at heart level.  · Each time you measure, take two or three readings one minute apart and record the results.  You may also need to have your blood pressure checked regularly by your health care provider.  General information  · Talk with your health care provider about your diet, exercise habits, and other lifestyle factors that may be contributing to hypertension.  · Review all the medicines you take with your health care provider because there may be side effects or interactions.  · Keep all visits as told by your health care provider. Your health care provider can help you create and adjust your plan for managing your high blood pressure.  Where to find more information  · National Heart, Lung, and Blood [place]: www.nhlbi.nih.gov  · American Heart Association: www.heart.org  Contact a health care provider if:  · You think you are having a reaction to medicines you have taken.  · You have repeated (recurrent) headaches.  · You feel dizzy.  · You have swelling in your ankles.  · You have trouble with your vision.  Get help right away if:  · You develop a severe headache or confusion.  · You have unusual weakness or numbness, or you feel faint.  · You have severe pain in your chest or abdomen.  · You vomit repeatedly.  · You have trouble breathing.  These symptoms may represent a serious problem that is an emergency. Do not wait to see if the symptoms will  go away. Get medical help right away. Call your local emergency services (911 in the [place] Do not drive yourself to the hospital.  Summary  · Hypertension is when the force of blood pumping through your arteries is too strong. If this condition is not controlled, it may put you at risk for serious complications.  · Your personal target blood pressure may vary depending on your medical conditions, your age, and other factors. For most people, a normal blood pressure is less than 120/80.  · Hypertension is managed by lifestyle changes, medicines, or both.  · Lifestyle changes to help manage hypertension include losing weight, eating a healthy, low-sodium diet, exercising more, stopping smoking, and limiting alcohol.  This information is not intended to replace advice given to you by your health care provider. Make sure you discuss any questions you have with your health care provider.  Document Revised: 01/22/2021 Document Reviewed: 11/17/2020  Elsevier Patient Education © 2021 [employer]

## 2021-12-14 NOTE — PROGRESS NOTES
"    I N T E R N A L  M E D I C I N E  J U N O H  K I M,  M D      ENCOUNTER DATE:  12/14/2021    Gia Jammal / 41 y.o. / female      CHIEF COMPLAINT     Annual Exam (12/2/20)      VITALS     Visit Vitals  /82 (BP Location: Left arm)   Pulse 81   Temp 97.1 °F (36.2 °C)   Ht 167.6 cm (66\")   Wt 72.6 kg (160 lb)   LMP 11/30/2021   SpO2 99%   BMI 25.82 kg/m²       BP Readings from Last 3 Encounters:   12/14/21 142/82   12/02/20 138/84   11/06/19 138/80     Wt Readings from Last 3 Encounters:   12/14/21 72.6 kg (160 lb)   12/02/20 70.8 kg (156 lb)   11/06/19 71.2 kg (157 lb)      Body mass index is 25.82 kg/m².    MEDICATIONS     Current Outpatient Medications on File Prior to Visit   Medication Sig Dispense Refill   • Cholecalciferol (VITAMIN D3) 2000 units capsule Take 1 capsule by mouth Daily.     • Multiple Vitamins-Minerals (CENTRUM ADULTS) tablet Take 1 tablet by mouth Daily.     • Omega-3 Fatty Acids (fish oil) 1000 MG capsule capsule Take  by mouth Daily With Breakfast.       No current facility-administered medications on file prior to visit.         HISTORY OF PRESENT ILLNESS     Gia presents for annual health maintenance visit.  > 1 years since last visit.     · General health: some medical problems  · Lifestyle:  · Attempting to lose weight?: No   · Diet: eats decently  · Exercise: exercises 2 days/week  · Tobacco: Never used   · Alcohol: occasional/infrequent  · Work: Full-time  · Reproductive health:  · Sexually active?: No   · Sexual problems?: No problems  · Concern for STD?: No    · Sees Gynecologist?: Yes   · Rosaan/Postmenopausal?: No   · Domestic abuse concerns: No   · Depression Screening:      PHQ-2/PHQ-9 Depression Screening 12/14/2021   Little interest or pleasure in doing things 0   Feeling down, depressed, or hopeless 0   Total Score 0         PHQ-2: 0 (Not depressed)   PHQ-9: 0 (Negative screening for depression)    Patient Care Team:  Kenyon Orellana MD as PCP - General (Internal " Medicine)  Nic Lerma MD as Consulting Physician (Hematology and Oncology)      ALLERGIES  Allergies   Allergen Reactions   • No Known Drug Allergy         PFSH:     The following portions of the patient's history were reviewed and updated as appropriate: Allergies / Current Medications / Past Medical History / Surgical History / Social History / Family History    PROBLEM LIST   Patient Active Problem List   Diagnosis   • Mixed hyperlipidemia   • Vitamin D deficiency   • Elevated hemoglobin A1c   • Eosinophilia   • Primary hypertension       PAST MEDICAL HISTORY  Past Medical History:   Diagnosis Date   • H/O Iron deficiency     Mild   • H/O Vitamin D deficiency    • Hyperlipidemia    • Iron deficiency anemia 5/3/2019   • Thyroid disease        SURGICAL HISTORY  Past Surgical History:   Procedure Laterality Date   • APPENDECTOMY     • WISDOM TOOTH EXTRACTION      one only       SOCIAL HISTORY  Social History     Socioeconomic History   • Marital status: Single   • Number of children: 0   • Years of education: College   Tobacco Use   • Smoking status: Never Smoker   • Smokeless tobacco: Never Used   • Tobacco comment: former social smoker when younger   Substance and Sexual Activity   • Alcohol use: Yes     Comment: occasionally during weekends   • Drug use: No   • Sexual activity: Not Currently     Birth control/protection: None       FAMILY HISTORY  Family History   Problem Relation Age of Onset   • Hyperlipidemia Mother    • Diabetes Mother    • Hypertension Mother    • Hypertension Father    • Hyperlipidemia Father    • Diabetes Father    • Lung cancer Maternal Grandmother         smoker    • Aortic aneurysm Paternal Grandfather    • No Known Problems Sister    • No Known Problems Brother    • Colon cancer Neg Hx    • Breast cancer Neg Hx        IMMUNIZATION HISTORY  Immunization History   Administered Date(s) Administered   • Td 02/25/2010         REVIEW OF SYSTEMS     Review of Systems   Constitutional:  Positive for unexpected weight change (mild wt gain).   HENT: Negative.    Eyes: Negative.    Respiratory: Negative.    Cardiovascular: Negative.    Gastrointestinal: Negative.    Endocrine: Negative.    Genitourinary: Negative.    Musculoskeletal: Negative.    Skin: Negative.    Allergic/Immunologic: Negative.    Neurological: Negative.    Hematological: Negative.    Psychiatric/Behavioral: Negative.          PHYSICAL EXAMINATION     Physical Exam  Constitutional:       General: She is not in acute distress.     Appearance: Normal appearance. She is well-developed.   HENT:      Head: Normocephalic and atraumatic.      Right Ear: Tympanic membrane, ear canal and external ear normal.      Left Ear: Tympanic membrane, ear canal and external ear normal.   Eyes:      General: No scleral icterus.     Conjunctiva/sclera: Conjunctivae normal.      Pupils: Pupils are equal, round, and reactive to light.   Neck:      Thyroid: No thyroid mass or thyromegaly.      Vascular: No carotid bruit.      Trachea: No tracheal deviation.   Cardiovascular:      Rate and Rhythm: Normal rate and regular rhythm.      Pulses: Normal pulses.      Heart sounds: Normal heart sounds.   Pulmonary:      Effort: Pulmonary effort is normal.      Breath sounds: Normal breath sounds.   Abdominal:      General: There is no distension.      Palpations: Abdomen is soft. There is no mass.      Tenderness: There is no abdominal tenderness.      Hernia: No hernia is present.   Genitourinary:     Comments: Deferred to gyne/by patient unless specified otherwise.   Musculoskeletal:         General: No deformity.      Cervical back: Normal range of motion and neck supple.   Skin:     General: Skin is warm.      Coloration: Skin is not jaundiced or pale.      Findings: No rash.      Comments: Multiple strawberry angiomas   Neurological:      General: No focal deficit present.      Mental Status: She is alert and oriented to person, place, and time.      Cranial  Nerves: No cranial nerve deficit.      Motor: No abnormal muscle tone.      Coordination: Coordination normal.      Deep Tendon Reflexes: Reflexes are normal and symmetric. Reflexes normal.   Psychiatric:         Mood and Affect: Mood normal.         Behavior: Behavior normal.         Thought Content: Thought content normal.         Judgment: Judgment normal.         REVIEWED DATA      Labs:    Lab Results   Component Value Date     12/07/2021    K 4.5 12/07/2021    CALCIUM 9.8 12/07/2021    AST 17 12/07/2021    ALT 16 12/07/2021    BUN 8 12/07/2021    CREATININE 0.69 12/07/2021    CREATININE 0.65 11/23/2020    CREATININE 0.74 04/22/2019    EGFRIFNONA 110 12/07/2021    EGFRIFAFRI 127 12/07/2021       Lab Results   Component Value Date    HGBA1C 5.8 (H) 12/07/2021    HGBA1C 5.50 11/23/2020    HGBA1C 5.80 (H) 11/06/2019    TSH 2.140 12/07/2021    FREET4 1.36 12/07/2021       Lab Results   Component Value Date     (H) 12/07/2021    HDL 49 12/07/2021    TRIG 117 12/07/2021    CHOLHDLRATIO 3.9 12/07/2021       Lab Results   Component Value Date    GIPM73UE 22.7 (L) 11/06/2019        Lab Results   Component Value Date    WBC 6.9 12/07/2021    HGB 12.1 12/07/2021    MCV 81 12/07/2021     12/07/2021       Lab Results   Component Value Date    PROTEIN Negative 11/23/2020    GLUCOSEU Negative 11/23/2020    BLOODU See below: (A) 11/23/2020    NITRITEU Negative 11/23/2020    LEUKOCYTESUR Negative 11/23/2020          Lab Results   Component Value Date    HEPCVIRUSABY <0.1 11/23/2020       Imaging:        Medical Tests:        ASSESSMENT & PLAN     ANNUAL WELLNESS EXAM / PHYSICAL     Other medical problems addressed today:  Problem List Items Addressed This Visit        High    Primary hypertension (Chronic)    Current Assessment & Plan     This is a new problem to this examiner.   Maintain low sodium diet of less than 3 gm.    Weight loss and regular aerobic exercise recommended.   Monitor home blood pressure  readings.  Send readings in 1 month.   Will need to consider medication if > 140/90.     BP Readings from Last 3 Encounters:   12/14/21 142/82   12/02/20 138/84   11/06/19 138/80                Medium    Mixed hyperlipidemia (Chronic)    Overview     Maintain low fat/cholesterol diet.           Current Assessment & Plan     Lab Results   Component Value Date     (H) 12/07/2021     (H) 11/23/2020     (H) 04/22/2019    HDL 49 12/07/2021    TRIG 117 12/07/2021    CHOLHDLRATIO 3.9 12/07/2021       Maintain low saturated fat/cholesterol diet.           Elevated hemoglobin A1c (Chronic)    Overview     Maintain a low sugar/starch/carbohydrate diet and exercise regularly. Weight loss advised.              Low    Vitamin D deficiency (Chronic)    Current Assessment & Plan     Increase vitamin D 2000 to 2 tablets daily     Lab Results   Component Value Date    WCZW33DR 22.7 (L) 11/06/2019    UPZP04KG 22.9 (L) 03/27/2019    WEFR24SM 9.6 (L) 01/02/2018             Relevant Medications    Cholecalciferol (VITAMIN D3) 2000 units capsule      Other Visit Diagnoses     Encounter for annual health examination    -  Primary          Summary/Discussion:     •     Next Appointment with me: Visit date not found    Return in about 4 months (around 4/14/2022) for Hypertension.      HEALTHCARE MAINTENANCE ISSUES     Cancer Screening:  · Colon: Initial/Next screening at age: 46  · Repeat colon cancer screening: N/A at this time  · Breast: Recommended monthly self exams; annual professional exam  · Mammogram: every 2 years (intends to make appointment to see gynecologist)  · Cervical: 3 years (intends to make appointment to see gynecologist)  · Skin: Monthly self skin examination, annual exam by health professional  · Lung: Does not meet criteria for lung cancer screening.   · Other:    Screening Labs & Tests:  · Lab results reviewed & discussed with the patient or test orders placed today.  · EKG:  · CV Screening:  Lipid panel  · DEXA (65+ or postmenopausal with risk factors):   · HEP C (If born 1620-8220, or risk factors): Previously had negative screen  · Other:     Immunization/Vaccinations (to be given today unless deferred by patient)  · Influenza: Patient deferred/declined flu vaccine (recommended annual vaccination)  · Hepatitis A: Declined by patient  · Hepatitis B: Verify immunization records  · Tetanus/Pertussis: Verify immunization records  · Pneumovax: Not needed at this time  · Shingles: Not needed at this time  · COVID: SUYAPA DHALIWAL COVID: Advised to take the vaccine     Lifestyle Counseling:  · Lifestyle Modifications: Attempt to lose weight, Improve dietary compliance, Increase intensity/regularity of aerobic exercise, Maintain a low sugar/carbohydrate diet, Follow a low fat, low cholesterol diet, Maintain low sodium diet (< 3 gm) for blood pressure, Reduce exposure to stress if possible, Discussed better management of stress/anxiety and Discussed sexual issues, safe sex practices, contraception  · Safety Issues: Always wear seatbelt, Avoid texting while driving   · Use sunscreen, regular skin examination  · Recommended annual dental/vision examination.  · Emotional/Stress/Sleep: Reviewed and  given when appropriate      Health Maintenance   Topic Date Due   • ANNUAL PHYSICAL  12/03/2021   • COVID-19 Vaccine (1) 12/16/2021 (Originally 12/11/1985)   • INFLUENZA VACCINE  12/14/2022 (Originally 8/1/2021)   • MAMMOGRAM  12/14/2022 (Originally 12/14/2021)   • TDAP/TD VACCINES (2 - Tdap) 12/14/2022 (Originally 2/25/2020)   • PAP SMEAR  03/05/2022   • LIPID PANEL  12/07/2022   • HEPATITIS C SCREENING  Completed   • Pneumococcal Vaccine 0-64  Aged Out           *Examiner was wearing KN95 mask and eye protection during the entire duration of the visit. Patient was masked the entire time. Minimum social distance of 6 ft maintained entire visit except if physical contact was necessary as documented.     **Rodney  Disclaimer: Much of this encounter note is an electronic transcription/translation of spoken language to printed text. The electronic translation of spoken language may permit erroneous, or at times, nonsensical words or phrases to be inadvertently transcribed. Although I have reviewed the note for such errors, some may still exist.       Template created by Brook Orellana MD

## 2021-12-14 NOTE — ASSESSMENT & PLAN NOTE
Lab Results   Component Value Date     (H) 12/07/2021     (H) 11/23/2020     (H) 04/22/2019    HDL 49 12/07/2021    TRIG 117 12/07/2021    CHOLHDLRATIO 3.9 12/07/2021       Maintain low saturated fat/cholesterol diet.

## 2021-12-14 NOTE — ASSESSMENT & PLAN NOTE
This is a new problem to this examiner.   Maintain low sodium diet of less than 3 gm.    Weight loss and regular aerobic exercise recommended.   Monitor home blood pressure readings.  Send readings in 1 month.   Will need to consider medication if > 140/90.     BP Readings from Last 3 Encounters:   12/14/21 142/82   12/02/20 138/84   11/06/19 138/80

## 2022-02-11 ENCOUNTER — TELEPHONE (OUTPATIENT)
Dept: CARDIOLOGY | Facility: CLINIC | Age: 42
End: 2022-02-11

## 2022-02-11 NOTE — TELEPHONE ENCOUNTER
----- Message from Estefania Chaney sent at 2/10/2022  9:57 AM EST -----  Regarding: CT  Contact: [phone]  Pt said dr maldonado office wants us to order a CT of chest for lung screening.  His ins denied it so he wants us to schedule it???????????????????    Spoke with Patient's daughter she has schedule apt for her father with Dr. urbano

## 2023-01-10 ENCOUNTER — TELEPHONE (OUTPATIENT)
Dept: INTERNAL MEDICINE | Age: 43
End: 2023-01-10
Payer: COMMERCIAL

## 2023-01-10 DIAGNOSIS — Z00.00 PREVENTATIVE HEALTH CARE: Primary | ICD-10-CM

## 2023-01-10 NOTE — TELEPHONE ENCOUNTER
Caller: Gia Graves    Relationship: Self    Best call back number: [phone]    What orders are you requesting (i.e. lab or imaging): LABS    In what timeframe would the patient need to come in: BEFORE PHYSICAL IN JULY    Where will you receive your lab/imaging services: IN OFFICE    Additional notes: PLEASE REACH OUT TO SCHEDULE

## 2023-07-17 ENCOUNTER — TELEPHONE (OUTPATIENT)
Dept: INTERNAL MEDICINE | Age: 43
End: 2023-07-17

## 2023-07-17 NOTE — TELEPHONE ENCOUNTER
Patient would like a call to discuss lab results from 07/05 as she will be unable to see Dr. Orellana for her physical this week.

## 2023-08-08 DIAGNOSIS — R73.09 ELEVATED HEMOGLOBIN A1C: Chronic | ICD-10-CM

## 2023-08-08 DIAGNOSIS — R31.29 MICROSCOPIC HEMATURIA: ICD-10-CM

## 2023-08-08 DIAGNOSIS — E78.2 MIXED HYPERLIPIDEMIA: Primary | Chronic | ICD-10-CM

## 2024-04-23 ENCOUNTER — TELEPHONE (OUTPATIENT)
Dept: INTERNAL MEDICINE | Age: 44
End: 2024-04-23
Payer: COMMERCIAL

## 2024-04-23 DIAGNOSIS — Z00.00 PREVENTATIVE HEALTH CARE: Primary | ICD-10-CM

## 2024-04-23 NOTE — TELEPHONE ENCOUNTER
Pt called to schedule lab work prior to physical on July 19. Labs scheduled for July 15. Please place labs in pts chart. Thank you

## 2024-11-11 NOTE — PROGRESS NOTES
GYN ANNUAL EXAM   Chief Complaint   Patient presents with    ngyn     AE MX 2017.No pap today       HPI    Gia is a 43 y.o. female who presents for annual well woman exam. She is a new patient to our practice. She does report pelvic pain intermittently that she estimates she has had for around 6 months.     OB History          0    Para   0    Term   0       0    AB   0    Living   0         SAB   0    IAB   0    Ectopic   0    Molar   0    Multiple   0    Live Births   0                SUBJECTIVE    MENSTRUAL & SEXUAL HEALTH  LMP: Patient's last menstrual period was 10/25/2024.  Menses regularity: regular every 28-30 days  Dysmenorrhea: none  Cyclic symptoms: none  Current contraception: none  Last pap: , Normal PAP  History of abnormal pap: no  History of STD: No  Family history of cancer: denies       Family history of breast cancer: no  Performs SBE: performs monthly.  Mammogram: 2017, Birads I (Normal).  History of abnormal mammogram: no  Incontinence: Patient reports that she is not currently experiencing any symptoms of urinary incontinence.   Dyspareunia: no    Past Medical History:   Diagnosis Date    H/O Iron deficiency     Mild    H/O Vitamin D deficiency     Hyperlipidemia     Iron deficiency anemia 5/3/2019    Thyroid disease        Past Surgical History:   Procedure Laterality Date    APPENDECTOMY      WISDOM TOOTH EXTRACTION      one only         Current Outpatient Medications:     Cholecalciferol (VITAMIN D3) 2000 units capsule, Take 1 capsule by mouth Daily., Disp: , Rfl:     Omega-3 Fatty Acids (fish oil) 1000 MG capsule capsule, Take  by mouth Daily With Breakfast., Disp: , Rfl:     Multiple Vitamins-Minerals (CENTRUM ADULTS) tablet, Take 1 tablet by mouth Daily., Disp: , Rfl:     Allergies   Allergen Reactions    No Known Drug Allergy        Social History     Tobacco Use    Smoking status: Never    Smokeless tobacco: Never    Tobacco comments:     former social smoker when  "younger   Vaping Use    Vaping status: Never Used   Substance Use Topics    Alcohol use: Yes     Comment: occasionally during weekends    Drug use: No       Family History   Problem Relation Age of Onset    Hyperlipidemia Mother     Diabetes Mother     Hypertension Mother     Hypertension Father     Hyperlipidemia Father     Diabetes Father     Lung cancer Maternal Grandmother         smoker     Aortic aneurysm Paternal Grandfather     No Known Problems Sister     No Known Problems Brother     Colon cancer Neg Hx     Breast cancer Neg Hx        Review of Systems   Constitutional:  Negative for fatigue, unexpected weight gain and unexpected weight loss.   Gastrointestinal:  Negative for abdominal pain.   Genitourinary:  Positive for pelvic pain. Negative for decreased libido, difficulty urinating, dyspareunia, dysuria, pelvic pressure, urgency, urinary incontinence and vaginal discharge.   All other systems reviewed and are negative.      OBJECTIVE     /96   Ht 167.6 cm (65.98\")   Wt 70.8 kg (156 lb)   LMP 10/25/2024   BMI 25.19 kg/m²     Physical Exam  Constitutional:       General: She is awake.      Appearance: Normal appearance. She is well-developed and well-groomed.   HENT:      Head: Normocephalic and atraumatic.   Pulmonary:      Effort: Pulmonary effort is normal.   Musculoskeletal:      Cervical back: Normal range of motion.   Neurological:      General: No focal deficit present.      Mental Status: She is alert and oriented to person, place, and time.   Skin:     General: Skin is warm and dry.   Psychiatric:         Mood and Affect: Mood normal.         Behavior: Behavior normal. Behavior is cooperative.   Vitals reviewed.         ASSESSMENT     Diagnoses and all orders for this visit:    1. Well woman exam (no gynecological exam) (Primary)      PLAN   WELL WOMAN EXAM: Pap smear declined by patient today. ASCCP recommendations explained to patient prior to declination. Recommend MVI daily.  "   PELVIC PAIN: Return to care for ultrasound and office visit in the next 2 weeks.   CONTRACEPTION: none   SMOKING STATUS: non smoker.  BREAST HEALTH: Encouraged annual mammogram screening. Instructed on how to perform SBE. Encouraged breast health self awareness.   EXERCISE: Encouraged 150 minutes of exercise per week if not medially contraindicated.   BMI: Body mass index is 25.19 kg/m².    Return in about 2 weeks (around 11/26/2024) for ultrasound and office visit.    I spent 30 minutes caring for Gia on this date of service. This time includes time spent by me in the following activities: preparing for the visit, reviewing tests, performing a medically appropriate examination and/or evaluation, counseling and educating the patient/family/caregiver, referring and communicating with other health care professionals, documenting information in the medical record, independently interpreting results and communicating that information with the patient/family/caregiver, care coordination, ordering medications, ordering test(s), ordering procedure(s), obtaining a separately obtained history, and reviewing a separately obtained history.    Nicole Foster CNM  11/12/2024  14:41 EST

## 2024-11-12 ENCOUNTER — OFFICE VISIT (OUTPATIENT)
Dept: OBSTETRICS AND GYNECOLOGY | Facility: CLINIC | Age: 44
End: 2024-11-12
Payer: COMMERCIAL

## 2024-11-12 VITALS
SYSTOLIC BLOOD PRESSURE: 166 MMHG | WEIGHT: 156 LBS | BODY MASS INDEX: 25.07 KG/M2 | DIASTOLIC BLOOD PRESSURE: 96 MMHG | HEIGHT: 66 IN

## 2024-11-12 DIAGNOSIS — Z00.00 WELL WOMAN EXAM (NO GYNECOLOGICAL EXAM): Primary | ICD-10-CM

## 2024-11-18 ENCOUNTER — PATIENT MESSAGE (OUTPATIENT)
Dept: OBSTETRICS AND GYNECOLOGY | Facility: CLINIC | Age: 44
End: 2024-11-18
Payer: COMMERCIAL

## 2024-11-18 ENCOUNTER — PATIENT ROUNDING (BHMG ONLY) (OUTPATIENT)
Dept: OBSTETRICS AND GYNECOLOGY | Facility: CLINIC | Age: 44
End: 2024-11-18
Payer: COMMERCIAL

## 2024-11-18 NOTE — PROGRESS NOTES
My chart message has been sent to the patient for PATIENT ROUNDING with [facility].

## 2024-11-18 NOTE — PROGRESS NOTES
"GYN VISIT    Chief Complaint   Patient presents with    Follow-up     Here today to talk about U/S        SUBJECTIVE    Gia is a 43 y.o.  who presents for a follow up visit for pelvic pain. She had an ultrasound performed prior to our visit together today. She declined transvaginal ultrasound today in favor of transabdominal approach. She also declined pap testing at her annual. She reports that the pain continues to be intermittent. She reports regular bowel movements generally every day or every other day.   LMP: Patient's last menstrual period was 10/25/2024.    Past Medical History:   Diagnosis Date    H/O Iron deficiency     Mild    H/O Vitamin D deficiency     Hyperlipidemia     Iron deficiency anemia 5/3/2019    Thyroid disease         Past Surgical History:   Procedure Laterality Date    APPENDECTOMY      WISDOM TOOTH EXTRACTION      one only        Review of Systems   Constitutional:  Negative for chills, diaphoresis, fatigue and fever.   Genitourinary:  Positive for pelvic pain. Negative for decreased urine volume, difficulty urinating, dyspareunia, flank pain, frequency, genital sores, hematuria, menstrual problem, urgency, urinary incontinence, vaginal bleeding, vaginal discharge and vaginal pain.   Hematological:  Negative for adenopathy.   All other systems reviewed and are negative.      OBJECTIVE     Vitals:    24 1506   BP: 162/97   Weight: 70.8 kg (156 lb)   Height: 167.6 cm (65.98\")        Physical Exam  Constitutional:       General: She is awake.      Appearance: Normal appearance. She is well-developed and well-groomed.   HENT:      Head: Normocephalic and atraumatic.   Pulmonary:      Effort: Pulmonary effort is normal.   Musculoskeletal:      Cervical back: Normal range of motion.   Neurological:      General: No focal deficit present.      Mental Status: She is alert and oriented to person, place, and time.   Skin:     General: Skin is warm and dry.   Psychiatric:         " Mood and Affect: Mood normal.         Behavior: Behavior normal. Behavior is cooperative.   Vitals reviewed.         ASSESSMENT/PLAN    Diagnoses and all orders for this visit:    1. Pelvic pain (Primary)  -     CT Abdomen Pelvis With & Without Contrast; Standing  -     CT Abdomen Pelvis With & Without Contrast      Discussed etiologies of pelvic pain including gynecologic, urologic, colorectal, neurologic, and musculoskeletal etiologies. Discussed further diagnostic and treatment strategies.  Discussed limitations of transabdominal approach only for ultrasound.   Since she declined trasvaginal imaging approach today, we discussed use of CT scan.     Return for annual exam or as needed before.    I spent 30 minutes caring for Gia on this date of service. This time includes time spent by me in the following activities: preparing for the visit, reviewing tests, performing a medically appropriate examination and/or evaluation, counseling and educating the patient/family/caregiver, referring and communicating with other health care professionals, documenting information in the medical record, independently interpreting results and communicating that information with the patient/family/caregiver, care coordination, ordering medications, ordering test(s), ordering procedure(s), obtaining a separately obtained history, and reviewing a separately obtained history.    Nicole oFster CNM  2024  11:49 EST        ULTRASOUND PRELIMINARY RESULT     DIAGNOSIS: pelvic pain  UTERUS:  length 8.81 cm, volume: 117.237 cm3  ENDOMETRIAL LININ.71 cm   FIBROIDS/POLYPS: not seen. .   OVARIES: right - not seen, left - not seen   OVARIAN CYSTS/MASSES:  ovaries not seen.  COMPARATIVE DATA: not available for examination  COMMENTS: uterus is anteverted, normal uterus with no obvious endometrial mass seen, ovaries not seen, no free fluid. Suboptimal visualization of the pelvic anatomy was obtained secondary to attenuation from  bowel. Transabdominal ultrasound was performed due to patient refusal of transvaginal ultrasound.

## 2024-11-19 ENCOUNTER — OFFICE VISIT (OUTPATIENT)
Dept: OBSTETRICS AND GYNECOLOGY | Facility: CLINIC | Age: 44
End: 2024-11-19
Payer: COMMERCIAL

## 2024-11-19 VITALS
BODY MASS INDEX: 25.07 KG/M2 | HEIGHT: 66 IN | SYSTOLIC BLOOD PRESSURE: 162 MMHG | WEIGHT: 156 LBS | DIASTOLIC BLOOD PRESSURE: 97 MMHG

## 2024-11-19 DIAGNOSIS — R10.2 PELVIC PAIN: Primary | ICD-10-CM

## 2024-11-25 ENCOUNTER — TELEPHONE (OUTPATIENT)
Dept: OBSTETRICS AND GYNECOLOGY | Facility: CLINIC | Age: 44
End: 2024-11-25
Payer: COMMERCIAL

## 2024-11-25 DIAGNOSIS — R10.2 PELVIC PAIN: Primary | ICD-10-CM

## 2024-11-25 NOTE — TELEPHONE ENCOUNTER
I does appear you issued one on 11/19/24-I can only confirm that because you copied the order to this message.  Usually orders will remain visible in the pts chart under Referrals or Imaging, even if it is cancelled, but your CT order did not.  It is no where in the chart.     The reason for the cancellation (per your msg) says: Reason for Cancellation: Patient Discharge.    That makes me think the CT was entered as an inpatient scan.  I do not see the order anywhere in her chart, so I cannot even verify who cancelled it.     I'm sorry I could not be more helpful.     Please re-enter the CT Scan and I will make sure she gets scheduled.     -Adela

## 2024-11-25 NOTE — TELEPHONE ENCOUNTER
----- Message from Nicole Foster sent at 11/25/2024 10:58 AM EST -----  Adela,   Can you tell whether or not this CT scan was scheduled for this patient? It looks like the order itself was cancelled.   Nicole  ----- Message -----  From: Discharge Provider, Automatic  Sent: 11/25/2024  10:29 AM EST  To: Nicole Foster CNM   Patient informed of below. Has imaging scheduled for July. Will follow-up with Dr. Clay Lomeli after    Kyle NICOLAS RN   [facility]

## 2024-11-25 NOTE — TELEPHONE ENCOUNTER
Yes! I received her referral and set it for scheduling at [facility].  They will call pt within the next 5 business days to schedule-they will need to obtain a PA first.      I will also send the pt a Jobbr[employer] with scheduling's phone number, incase she doesn't want to wait for their call.

## 2024-12-10 ENCOUNTER — TELEPHONE (OUTPATIENT)
Dept: OBSTETRICS AND GYNECOLOGY | Facility: CLINIC | Age: 44
End: 2024-12-10
Payer: COMMERCIAL

## 2024-12-10 NOTE — TELEPHONE ENCOUNTER
Just an FYI-after you re-entered the order for pt to have a CT Scan, [facility] called pt to schedule.  They documented: 12/2/2024-pt wants to speak to her primary provider before scheduling.  Appears pt is scheduled to see her PCP on 1/22/25.    Just wanted to make you aware.       -Adela

## 2024-12-10 NOTE — TELEPHONE ENCOUNTER
----- Message from Nicole Foster sent at 11/25/2024 10:58 AM EST -----  Adela,   Can you tell whether or not this CT scan was scheduled for this patient? It looks like the order itself was cancelled.   Nicole  ----- Message -----  From: Discharge Provider, Automatic  Sent: 11/25/2024  10:29 AM EST  To: Nicole Foster CNM

## 2025-01-15 ENCOUNTER — LAB (OUTPATIENT)
Dept: INTERNAL MEDICINE | Age: 45
End: 2025-01-15
Payer: COMMERCIAL

## 2025-01-22 ENCOUNTER — OFFICE VISIT (OUTPATIENT)
Dept: INTERNAL MEDICINE | Age: 45
End: 2025-01-22
Payer: COMMERCIAL

## 2025-01-22 VITALS
HEIGHT: 66 IN | SYSTOLIC BLOOD PRESSURE: 160 MMHG | TEMPERATURE: 96.8 F | OXYGEN SATURATION: 100 % | HEART RATE: 72 BPM | BODY MASS INDEX: 25.23 KG/M2 | WEIGHT: 157 LBS | DIASTOLIC BLOOD PRESSURE: 100 MMHG

## 2025-01-22 DIAGNOSIS — R51.9 FREQUENT HEADACHES: ICD-10-CM

## 2025-01-22 DIAGNOSIS — Z00.00 ENCOUNTER FOR ANNUAL HEALTH EXAMINATION: Primary | ICD-10-CM

## 2025-01-22 DIAGNOSIS — I70.0 ATHEROSCLEROSIS OF ABDOMINAL AORTA: ICD-10-CM

## 2025-01-22 DIAGNOSIS — E78.2 MIXED HYPERLIPIDEMIA: Chronic | ICD-10-CM

## 2025-01-22 DIAGNOSIS — I10 PRIMARY HYPERTENSION: Chronic | ICD-10-CM

## 2025-01-22 DIAGNOSIS — R73.09 ELEVATED HEMOGLOBIN A1C: Chronic | ICD-10-CM

## 2025-01-22 PROCEDURE — 99214 OFFICE O/P EST MOD 30 MIN: CPT | Performed by: INTERNAL MEDICINE

## 2025-01-22 PROCEDURE — 99386 PREV VISIT NEW AGE 40-64: CPT | Performed by: INTERNAL MEDICINE

## 2025-01-22 RX ORDER — AMLODIPINE BESYLATE 5 MG/1
5 TABLET ORAL DAILY
Qty: 30 TABLET | Refills: 3 | Status: SHIPPED | OUTPATIENT
Start: 2025-01-22

## 2025-01-22 RX ORDER — BUTYROSPERMUM PARKII(SHEA BUTTER), SIMMONDSIA CHINENSIS (JOJOBA) SEED OIL, ALOE BARBADENSIS LEAF EXTRACT .01; 1; 3.5 G/100G; G/100G; G/100G
LIQUID TOPICAL
COMMUNITY

## 2025-01-22 NOTE — ASSESSMENT & PLAN NOTE
History of migraines but likely triggered by uncontrolled hypertension.   Start amlodipine 5 mg daily for hypertension.   Call if not improved.

## 2025-01-22 NOTE — PROGRESS NOTES
"        J  U  N  O  H    K  I  M ,   M  D      I  N  T  E  R  N  A  L    M  E  D  I  C  I  N  E         ENCOUNTER DATE:  01/22/2025    Gia Jammal / 44 y.o. / female    ANNUAL PREVENTATIVE / PHYSICAL ENCOUNTER       CHIEF COMPLAINT     Annual Exam (12/14/21), Hypertension, Hyperlipidemia, and Headaches      VITALS     Vitals:    01/22/25 0720   BP: 160/100   Pulse: 72   Temp: 96.8 °F (36 °C)   SpO2: 100%   Weight: 71.2 kg (157 lb)   Height: 167.6 cm (65.98\")       BP Readings from Last 3 Encounters:   01/22/25 160/100   11/19/24 162/97   11/12/24 166/96     Wt Readings from Last 3 Encounters:   01/22/25 71.2 kg (157 lb)   11/19/24 70.8 kg (156 lb)   11/12/24 70.8 kg (156 lb)      Body mass index is 25.36 kg/m².    Blood pressure readings recorded on patient flowsheet:       No data to display                  MEDICATIONS     Current Outpatient Medications on File Prior to Visit   Medication Sig Dispense Refill    Cholecalciferol (VITAMIN D3) 2000 units capsule Take 1 capsule by mouth Daily.      Magnesium Citrate 100 MG capsule Take  by mouth.      Omega-3 Fatty Acids (fish oil) 1000 MG capsule capsule Take  by mouth Daily With Breakfast.      [DISCONTINUED] Multiple Vitamins-Minerals (CENTRUM ADULTS) tablet Take 1 tablet by mouth Daily. (Patient not taking: Reported on 1/22/2025)       No current facility-administered medications on file prior to visit.         HISTORY OF PRESENT ILLNESS     Gia presents for annual health maintenance visit.    Patient states that she has noticed more frequent headaches recently.  This is associated with higher blood pressure readings at home which generally run between 140s-150s over 90s.  Currently she does not take any medication for hypertension but has strong family history of hypertension.  Patient states that her prior CT scan of the abdomen from 2019 showed early evidence of atherosclerosis of the abdominal aorta which concerns her therefore she made an appointment to " see a cardiologist regarding this issue.  This appointment is on February 4.  She denies any unusual chest pains, dyspnea exertion or heart palpitations.  Patient states that she is concerned about her family history of aortic aneurysms.  She is open to starting a cholesterol-lowering medication if needed for cholesterol but would like to recheck her lipid panel in 3 months.  She states that she was eating a lot of dairy and cheese products after coming out of fasting prior to the lab appointment.    Patient Care Team:  Kenyon Orellana MD as PCP - General (Internal Medicine)  Nicole Foster CNM as Nurse Practitioner (Obstetrics)    General health: some medical problems  Lifestyle:  Attempting to lose weight?: No   Diet: eats decently and high dietary fat  Exercise: generally sedentary  Tobacco: Remote history (short-term)   Alcohol: occasional/infrequent  Work: Full-time  Reproductive health:  Sexually active?: No   Sexual problems?: No problems  Concern for STD?: No    Sees Gynecologist?: Yes   Rosana/Postmenopausal?: No   Depression Screening:        PHQ-2 Depression Screening  Little interest or pleasure in doing things? Not at all   Feeling down, depressed, or hopeless? Not at all   PHQ-2 Total Score 0            1/22/2025     7:25 AM   PHQ-2/PHQ-9 Depression Screening   Little interest or pleasure in doing things Not at all   Feeling down, depressed, or hopeless Not at all   How difficult have these problems made it for you to do your work, take care of things at home, or get along with other people? Not difficult at all        PHQ-2: 0 (Not depressed)   PHQ-9: 0 (Negative screening for depression)    ALLERGIES  Allergies   Allergen Reactions    No Known Drug Allergy         PFSH:     The following portions of the patient's history were reviewed and updated as appropriate: Allergies / Current Medications / Past Medical History / Surgical History / Social History / Family History    PROBLEM LIST   Patient Active  Problem List   Diagnosis    Mixed hyperlipidemia    Vitamin D deficiency    Elevated hemoglobin A1c    Eosinophilia    Primary hypertension    Frequent headaches    Atherosclerosis of abdominal aorta       PAST MEDICAL HISTORY  Past Medical History:   Diagnosis Date    H/O Iron deficiency     Mild    H/O Vitamin D deficiency     Hyperlipidemia     Iron deficiency anemia 5/3/2019    Thyroid disease        SURGICAL HISTORY  Past Surgical History:   Procedure Laterality Date    APPENDECTOMY      WISDOM TOOTH EXTRACTION      one only       SOCIAL HISTORY  Social History     Socioeconomic History    Marital status: Single    Number of children: 0    Years of education: College   Tobacco Use    Smoking status: Never    Smokeless tobacco: Never    Tobacco comments:     former social smoker when younger   Vaping Use    Vaping status: Never Used   Substance and Sexual Activity    Alcohol use: Yes     Comment: occasionally during weekends    Drug use: No    Sexual activity: Not Currently     Birth control/protection: None       FAMILY HISTORY  Family History   Problem Relation Age of Onset    Hyperlipidemia Mother     Diabetes Mother     Hypertension Mother     Hypertension Father     Hyperlipidemia Father     Diabetes Father     No Known Problems Sister     No Known Problems Brother     Lung cancer Maternal Grandmother         smoker     Aortic aneurysm Paternal Grandfather     Melanoma Paternal Uncle     Esophageal cancer Paternal Uncle     Colon cancer Neg Hx     Breast cancer Neg Hx        IMMUNIZATION HISTORY  Immunization History   Administered Date(s) Administered    Td (TDVAX) 02/25/2010         REVIEW OF SYSTEMS     Review of Systems   Constitutional: Negative.    Eyes: Negative.  Negative for visual disturbance.   Respiratory: Negative.     Cardiovascular: Negative.  Negative for chest pain and palpitations.   Gastrointestinal: Negative.    Endocrine: Negative.    Genitourinary: Negative.    Musculoskeletal:  Negative.    Skin: Negative.    Allergic/Immunologic: Negative.    Neurological:  Positive for headaches.   Hematological: Negative.    Psychiatric/Behavioral: Negative.          Stress/anxiety          PHYSICAL EXAMINATION     Physical Exam  Constitutional:       General: She is not in acute distress.     Appearance: Normal appearance. She is well-developed.   HENT:      Head: Normocephalic and atraumatic.      Right Ear: Tympanic membrane, ear canal and external ear normal.      Left Ear: Tympanic membrane, ear canal and external ear normal.      Mouth/Throat:      Mouth: Mucous membranes are moist.      Pharynx: Oropharynx is clear.   Eyes:      General: No scleral icterus.     Conjunctiva/sclera: Conjunctivae normal.      Pupils: Pupils are equal, round, and reactive to light.   Neck:      Thyroid: No thyroid mass or thyromegaly.      Vascular: No carotid bruit.      Trachea: No tracheal deviation.   Cardiovascular:      Rate and Rhythm: Normal rate and regular rhythm.      Pulses: Normal pulses.      Heart sounds: Normal heart sounds.   Pulmonary:      Effort: Pulmonary effort is normal.      Breath sounds: Normal breath sounds.   Abdominal:      General: There is no distension or abdominal bruit.      Palpations: Abdomen is soft. There is no mass or pulsatile mass.      Tenderness: There is no abdominal tenderness.      Hernia: No hernia is present.   Genitourinary:     Comments: Deferred to gyne/by patient unless specified otherwise.   Musculoskeletal:      Cervical back: Neck supple.      Comments: Trace bilateral lower extremity edema   Lymphadenopathy:      Cervical: No cervical adenopathy.   Skin:     General: Skin is warm.      Coloration: Skin is not jaundiced or pale.      Findings: No rash.   Neurological:      General: No focal deficit present.      Mental Status: She is alert and oriented to person, place, and time.      Cranial Nerves: No cranial nerve deficit.      Motor: No abnormal muscle tone.       Coordination: Coordination normal.   Psychiatric:         Mood and Affect: Mood normal.         Behavior: Behavior normal.         Thought Content: Thought content normal.         Judgment: Judgment normal.         REVIEWED DATA      Labs:    Lab Results   Component Value Date     01/15/2025    K 4.6 01/15/2025    CALCIUM 9.7 01/15/2025    AST 19 01/15/2025    ALT 21 01/15/2025    BUN 12 01/15/2025    CREATININE 0.71 01/15/2025    CREATININE 0.63 07/05/2023    CREATININE 0.69 12/07/2021    EGFRIFNONA 110 12/07/2021    EGFRIFAFRI 127 12/07/2021     Lab Results   Component Value Date    GLUCOSE 108 (H) 01/15/2025    HGBA1C 5.70 (H) 01/15/2025    HGBA1C 6.00 (H) 07/05/2023    HGBA1C 5.8 (H) 12/07/2021    TSH 1.220 01/15/2025    FREET4 1.30 01/15/2025     Lab Results   Component Value Date     (H) 01/15/2025    HDL 56 01/15/2025    TRIG 96 01/15/2025    CHOLHDLRATIO 4.39 01/15/2025     Lab Results   Component Value Date    LNRQ42YG 22.7 (L) 11/06/2019      Lab Results   Component Value Date    WBC 6.63 01/15/2025    HGB 11.0 (L) 01/15/2025    MCV 81.0 01/15/2025     01/15/2025     Lab Results   Component Value Date    PROTEIN Trace (A) 01/15/2025    GLUCOSEU Negative 01/15/2025    BLOODU See below: (A) 01/15/2025    NITRITEU Negative 01/15/2025    LEUKOCYTESUR Negative 01/15/2025     Lab Results   Component Value Date    HEPCVIRUSABY <0.1 11/23/2020       Imaging:    CT ABDOMEN PELVIS W CONTRAST (10/04/2019 13:07)   1. No mass or other localizing right lower quadrant abnormality. No signs of acute inflammation. Appendix is absent.   2. No significant abdominal or pelvic abnormality.   3. Incidental 3 mm right lower lobe lung nodule. In the absence of significant smoking history or known primary malignancy, an incidental nodule of this size can be ignored. In the presence of such risk factors, follow-up CT chest would be recommended in    one year.   4. Early atherosclerotic aortic calcification,  notable given the patient's relatively young age.           Medical Tests:              Summary of old records / correspondence / consultant report:               Request outside records:         ASSESSMENT & PLAN     ANNUAL WELLNESS EXAM / PHYSICAL     Other medical problems addressed today:  Problem List Items Addressed This Visit          High    Primary hypertension (Chronic)    Current Assessment & Plan     BP Readings from Last 3 Encounters:   01/22/25 160/100   11/19/24 162/97   11/12/24 166/96       Blood pressure is high here and at home.    Start amlodipine 5 mg every morning.    Maintain low-sodium diet of less than 3 g/day.  Increase physical activity as tolerated.         Relevant Medications    amLODIPine (NORVASC) 5 MG tablet       Medium    Mixed hyperlipidemia (Chronic)    Overview     2019 CT of the abdomen showed early atherosclerosis of the abdominal aorta         Current Assessment & Plan     Lab Results   Component Value Date     (H) 01/15/2025     (H) 07/05/2023     (H) 12/07/2021    TRIG 96 01/15/2025    CHOLHDLRATIO 4.39 01/15/2025       LDL cholesterol is significantly higher.  Maintain low saturated fat diet (decrease intake of cheese and dairy).  Recheck fasting labs 1 week prior to follow-up in 3 months and consider statin therapy.    2019 CT of the abdomen showed early evidence of atherosclerosis of the abdominal aorta.         Relevant Orders    Lipid Panel With / Chol / HDL Ratio    Elevated hemoglobin A1c (Chronic)    Overview     Maintain a low sugar/starch/carbohydrate diet and exercise regularly. Weight loss advised.           Frequent headaches    Current Assessment & Plan     History of migraines but likely triggered by uncontrolled hypertension.   Start amlodipine 5 mg daily for hypertension.   Call if not improved.          Atherosclerosis of abdominal aorta (Chronic)    Overview     Noted on 2019 CT of abdomen: mild atherosclerosis         Current  Assessment & Plan     Early onset is concerning. LDL cholesterol is significantly higher. Recommended statin therapy but she would like to recheck her lipid panel in 3 months and make a decision at that time but is open to starting a medication if needed.  She has an upcoming cardiology visit on February 4.          Other Visit Diagnoses       Encounter for annual health examination    -  Primary            Orders Placed This Encounter   Procedures    Lipid Panel With / Chol / HDL Ratio     Standing Status:   Future     Standing Expiration Date:   1/22/2026     Order Specific Question:   Release to patient     Answer:   Routine Release [[id number]]        Summary/Discussion:         Next Appointment with me: 4/30/2025    Return in about 3 months (around 4/22/2025) for Hypertension, FASTING LABS 1 WEEK PRIOR TO FOLLOWUP.      HEALTHCARE MAINTENANCE ISSUES     Health Maintenance   Topic Date Due    BMI FOLLOWUP  Never done    TDAP/TD VACCINES (2 - Tdap) 02/25/2020    MAMMOGRAM  12/11/2021    PAP SMEAR  03/05/2022    COVID-19 Vaccine (1 - 2024-25 season) Never done    INFLUENZA VACCINE  03/31/2025 (Originally 7/1/2024)    ANNUAL PHYSICAL  11/12/2025    LIPID PANEL  01/15/2026    HEPATITIS C SCREENING  Completed    Pneumococcal Vaccine 0-64  Aged Out       Cancer Screening:  Colon: Initial/Next screening at age: 46  Repeat colon cancer screening: N/A at this time  Breast: Recommended monthly self exams; annual professional exam  Mammogram: Overdue (patient declines) and will let me know if she would like an order placed  Cervical: 3 years  Lung: Does not meet criteria for lung cancer screening.   Skin: Monthly self skin examination, annual exam by health professional  Other:    Screening Labs & Tests:  CV Screening: Lipid panel  DEXA (65+ or postmenopausal with risk factors): N/A  Other:     Immunization/Vaccinations:    Immunization History   Administered Date(s) Administered    Td (TDVAX) 02/25/2010       Influenza:  Patient deferred/declined flu vaccine (recommended annual vaccination)  Hepatitis A: Recommended here or at pharmacy  Hepatitis B: Advised to consider vaccine  Tetanus/Pertussis: Recommended here or at pharmacy and Declined by patient  Pneumococcal: Not needed at this time  Shingles: Not needed at this time  COVID: Does not plan to get the latest booster  RSV: Not indicated  HPV: Advised to consider vaccine    Lifestyle Counseling:  Lifestyle Modifications: Attempt to lose weight, Improve dietary compliance, Begin progressive aerobic exercise program 3-5 days a week, Maintain a low sugar/carbohydrate diet, Follow a low fat, low cholesterol diet, Maintain low sodium diet (< 3 gm) for blood pressure, Make dinner the lightest meal of day, Reduce exposure to stress if possible, Discussed better management of stress/anxiety, and Discussed sexual issues, safe sex practices, contraception  Safety Issues: Always wear seatbelt, Avoid texting while driving   Use sunscreen, regular skin examination  Recommended annual dental/vision examination.  Emotional/Stress/Sleep: Reviewed and  given when appropriate

## 2025-01-22 NOTE — ASSESSMENT & PLAN NOTE
BP Readings from Last 3 Encounters:   01/22/25 160/100   11/19/24 162/97   11/12/24 166/96       Blood pressure is high here and at home.    Start amlodipine 5 mg every morning.    Maintain low-sodium diet of less than 3 g/day.  Increase physical activity as tolerated.

## 2025-01-22 NOTE — ASSESSMENT & PLAN NOTE
Lab Results   Component Value Date     (H) 01/15/2025     (H) 07/05/2023     (H) 12/07/2021    TRIG 96 01/15/2025    CHOLHDLRATIO 4.39 01/15/2025       LDL cholesterol is significantly higher.  Maintain low saturated fat diet (decrease intake of cheese and dairy).  Recheck fasting labs 1 week prior to follow-up in 3 months and consider statin therapy.    2019 CT of the abdomen showed early evidence of atherosclerosis of the abdominal aorta.

## 2025-01-22 NOTE — ASSESSMENT & PLAN NOTE
Early onset is concerning. LDL cholesterol is significantly higher. Recommended statin therapy but she would like to recheck her lipid panel in 3 months and make a decision at that time but is open to starting a medication if needed.  She has an upcoming cardiology visit on February 4.